# Patient Record
Sex: FEMALE | Race: WHITE
[De-identification: names, ages, dates, MRNs, and addresses within clinical notes are randomized per-mention and may not be internally consistent; named-entity substitution may affect disease eponyms.]

---

## 2017-01-03 ENCOUNTER — HOSPITAL ENCOUNTER (EMERGENCY)
Dept: HOSPITAL 21 - SED | Age: 42
Discharge: HOME | End: 2017-01-03
Payer: COMMERCIAL

## 2017-01-03 VITALS
DIASTOLIC BLOOD PRESSURE: 97 MMHG | OXYGEN SATURATION: 99 % | HEART RATE: 123 BPM | RESPIRATION RATE: 18 BRPM | SYSTOLIC BLOOD PRESSURE: 146 MMHG

## 2017-01-03 VITALS — WEIGHT: 180.38 LBS | HEIGHT: 66 IN | BODY MASS INDEX: 28.99 KG/M2

## 2017-01-03 DIAGNOSIS — K57.92: Primary | ICD-10-CM

## 2017-01-03 DIAGNOSIS — K21.9: ICD-10-CM

## 2017-01-03 DIAGNOSIS — Z88.0: ICD-10-CM

## 2017-01-03 DIAGNOSIS — Z88.8: ICD-10-CM

## 2017-01-03 DIAGNOSIS — G89.29: ICD-10-CM

## 2017-01-03 DIAGNOSIS — Z90.49: ICD-10-CM

## 2017-01-03 DIAGNOSIS — Z87.442: ICD-10-CM

## 2017-01-03 DIAGNOSIS — Z88.5: ICD-10-CM

## 2017-01-03 DIAGNOSIS — Z88.6: ICD-10-CM

## 2017-01-03 DIAGNOSIS — Z88.2: ICD-10-CM

## 2017-01-03 DIAGNOSIS — I10: ICD-10-CM

## 2017-01-03 DIAGNOSIS — J45.909: ICD-10-CM

## 2017-01-03 LAB
BASOPHILS % (AUTO): 0.3 % (ref 0–3)
EOSINOPHILS % (AUTO): 2 % (ref 0–5)
LIPASE SERPL-CCNC: 32 U/L (ref 13–60)
MAGNESIUM: 1.8 MG/DL (ref 1.6–2.6)
MCH RBC QN AUTO: 29.8 PG (ref 27–35)
MEAN CORPUSCULAR VOLUME: 89.6 FL (ref 81–100)
MONOCYTES % (AUTO): 5.3 % (ref 4–12)
NEUTROPHILS NFR BLD AUTO: 72.1 % (ref 40–74)
PLATELET COUNT: 316 BIL/L (ref 150–400)

## 2017-01-03 PROCEDURE — 96374 THER/PROPH/DIAG INJ IV PUSH: CPT

## 2017-01-03 PROCEDURE — 96375 TX/PRO/DX INJ NEW DRUG ADDON: CPT

## 2017-01-03 PROCEDURE — 83690 ASSAY OF LIPASE: CPT

## 2017-01-03 PROCEDURE — 36415 COLL VENOUS BLD VENIPUNCTURE: CPT

## 2017-01-03 PROCEDURE — 96376 TX/PRO/DX INJ SAME DRUG ADON: CPT

## 2017-01-03 PROCEDURE — 96361 HYDRATE IV INFUSION ADD-ON: CPT

## 2017-01-03 PROCEDURE — 83735 ASSAY OF MAGNESIUM: CPT

## 2017-01-03 PROCEDURE — 80053 COMPREHEN METABOLIC PANEL: CPT

## 2017-01-03 PROCEDURE — 85025 COMPLETE CBC W/AUTO DIFF WBC: CPT

## 2017-01-03 PROCEDURE — 99284 EMERGENCY DEPT VISIT MOD MDM: CPT

## 2017-01-03 NOTE — ED.REPORT
HPI-Abd Pain F 40 and Over


Date of Service


Reagan 3, 2017


 ED Provider: Dr. Toure


Pt is a 40 y/o female w/ a hx of diverticulitis, chronic pain, HTN, presenting 

to the ED due to lower abdominal pain onset 3 days ago. Pt was seen 3 days ago 

in the ED with the same symptoms and diagnosed with diverticulitis. It was 

recommended that she be admitted but she decided to go home. She was discharged 

on Levaquin, Flagyl, Percocet, and Zofran. Her symptoms have not improved. She 

denies fever, chills.


Nursing Notes


Stated Complaint:  NAUSEA/VOMITING/ABD PAIN


Chief Complaint:  Female Abdominal Pain


Nursing Notes Reviewed:  Yes


Allergies:  


Coded Allergies:  


     Sulfa (Sulfonamide Antibiotics) (Verified  Allergy, Severe, Anaphylaxis, 1/

3/17)


     meperidine (Verified  Allergy, Severe, ANAPHYLAXIS, 1/3/17)


     venom-honey bee (Verified  Allergy, Severe, Anaphylaxis, 1/3/17)


     Penicillins (Verified  Allergy, Unknown, 1/3/17)


     rofecoxib (Verified  Adverse Reaction, Severe, UPSET STOMACH, 7/17/16)


Scheduled


Fluoxetine (Prozac) 20 Mg Capsule 20 MG PO DAILY 


Levofloxacin (Levofloxacin) 750 Mg Tablet 750 MG PO DAILY 


Metronidazole (Metronidazole) 500 Mg Tablet 500 MG PO TID 


Ondansetron ODT (Ondansetron ODT) 8 Mg Tab.rapdis 8 MG PO QID 


Topiramate ER (Topiramate ER) 100 Mg Capsule 100 MG PO DAILY 





Scheduled PRN


Albuterol HFA (Proair HFA) 8.5 Gm Hfa.aer.ad 2 PUFFS INHALATION Q4H PRN PRN For 

Shortness of Breath 


Cyclobenzaprine (Cyclobenzaprine) 10 Mg Tablet 10 MG PO TID PRN PRN Spasm 


Epinephrine (Epipen 2-John) 0.3 Mg/0.3 Ml Auto.injct 0.3 MG IJ PRN PRN PRN For 

Anaphyllaxis 


Ibuprofen (Ibuprofen) 800 Mg Tablet 800 MG PO TID PRN PRN For Pain 


Metoclopramide (Reglan) 10 Mg Tablet 10 MG PO TID PRN PRN For Nausea 


Ondansetron ODT (Zofran ODT) 8 Mg Tablet 8 MG PO Q4H PRN PRN For Nausea 


Promethazine (Promethazine) 25 Mg Tablet 25 MG PO Q6H PRN PRN For Nausea 


Promethazine (Promethazine) 25 Mg Tablet 25 MG PO Q6H PRN PRN For Nausea/

Vomiting 


oxyCODONE-Acetaminophen 5-325 mg (oxyCODONE-Acetaminophen 5-325 mg) 1 Each 

Tablet 1-2 TAB PO Q6H PRN PRN For Pain 


oxyCODONE-Acetaminophen 5-325 mg (oxyCODONE-Acetaminophen 5-325 mg) 1 Each 

Tablet 1-2 TAB PO Q4H PRN PRN For Pain 





General


Time Seen by MD:  16:23





Chief Complaint


Abdominal pain


Hx Obtained From:  Patient


Arrived By:  Walk-in


Sudden in Onset?:  No


Onset Occurred:  3 days ago


Symptom Duration:  Since onset


Location:  : Abdomen lower


Quality:  Painful


Severity: Current:  Mild


Severity: Maximum:  Moderate


Recent Healthcare:  Recent doctor visit, Recent testing, Previous diagnosis, 

Prior workup


Similar Sx Previous:  Yes





Past Medical History


Past Medical History Notes:  


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


violaton per EMR 6/7/16


Patient has been seen in ED for Abd Pain multiple 


visits, no etiology determined





Surgeon: Lion Hathaway





Last abd CT--1/2016


Past Medical History





PAST MEDICAL HISTORY:


1.  Chronic neck and back pain secondary to degenerative disk disease.


2.  Chronic abdominal pain, with prior suggestion of possible sclerosing


    mesenteritis.


3.  Hypertension, not currently on medications.


4.  Asthma.


5.  Depression


6. Diverticulosis with hx of diverticulitis


7. Kidney stones


8. Gall bladder disease


9. GERD





Past Surgical History





PAST SURGICAL HISTORY:


1.  Bladder suspension.


2.  Right knee surgery.


3.  Right foot surgery.


4.  Cholecystectomy.


5.  Hysterectomy.


6.  Appendectomy.





Right Rotator Cuff Repair 8/6/2015-Dr. Hathaway


EGD and colonoscopy in May 2013 both negative


Bladder suspension


Right knee surgery


Fifth metatarsal surgery


Partial hysterectomy





Family History





Noncontributory





Smoking History


Never Smoker





Social History


Alcohol Use:  "Social"


Drug Use:  Denies drug use


Other Social History:  Local resident





Occupation





lives with partner of 17 years. On [place] [employer] [place]  10/22/2016





Ambulatory Status


Independent





Review of Systems


Constitutional:  Denies: Chills, Fever


GI:  Reports: Abdominal pain, Nausea, Vomiting, 


   Denies: Diarrhea


Complete sys rev & neg:  except as marked.





Physical Exam


Vital Signs





 Vital Signs (First)








  Date Time  Temp Pulse Resp B/P Pulse Ox O2 Delivery O2 Flow Rate FiO2


 


1/3/17 15:57 36.4 123 18 146/97 99 Room Air  








Initial VS:  Reviewed, Vital signs abnormal


    Head / Eyes:  Atraumatic, Normocephalic, PERRL


    ENT:  Mucous membranes moist, Conjunctiva normal, No scleral icterus


    Neck:  Supple, Full range of motion


    Extremities:  Vascular intact, Neuro intact, No swelling, No tenderness


    Skin:  Warm, Dry, No cyanosis


    Neurologic:  Alert, Oriented, Nonfocal


    Psychiatric:  Mood/affect normal, Behavior normal, Normal thought content


General/Constitutional:  Awake, Alert, No acute distress, Cooperative, Not 

toxic appearing


Respiratory / Chest:  Atraumatic, Breath sounds NL, Breath sounds = bilat, No 

respiratory distress, No rales, No rhonchi, No wheezing, No retractions, No 

stridor, No chest tenderness, No chest wall deformity, No crepitus


Cardiovascular:  Heart rate NL, Regular rhythm, Heart sounds NL, No gallop, No 

murmurs, No rubs, Cap refill not delayed, Peripheral circulation NL


Abdomen:  Atraumatic, Soft, No guarding, No rebound





Left sided abdominal tenderness


Back:  Full range of motion, Painless range of motion





Interpretation & Diagnostics


Lab Results Interpretation


Result Diagram:  


1/3/17 1611                                                                    

            1/3/17 1611














Test


  1/3/17


16:11


 


White Blood Count


  7.4th/mm3


(3.8-10.1)


 


Red Blood Count


  4.73mil/mm3


(3.90-5.20)


 


Hemoglobin


  14.1g/dL


(12.0-15.6)


 


Hematocrit


  42.4%


(35.0-46.0)


 


Mean Corpuscular Volume


  89.6fL


()


 


Mean Corpuscular Hemoglobin


  29.8pg


(27.0-35.0)


 


Mean Corpuscular Hemoglobin


Concent 33.3%


(32.0-37.0)


 


Red Cell Distribution Width


  12.3%


(12.3-15.4)


 


Platelet Count


  316bil/L


(150-400)


 


Neutrophils (%) (Auto) 72.1% (40-74) 


 


Lymphocytes (%) (Auto) 20.2% (14-46) 


 


Monocytes (%) (Auto) 5.3% (4-12) 


 


Eosinophils (%) (Auto) 2.0% (0-5) 


 


Basophils (%) (Auto) 0.3% (0-3) 


 


Sodium Level


  139mEq/L


(134-144)


 


Potassium Level


  3.8mEq/L


(3.5-5.2)


 


Chloride Level


  101mEq/L


()


 


Carbon Dioxide Level


  27mmol/L


(18-29)


 


Blood Urea Nitrogen 11mg/dL (6-24) 


 


Creatinine


  0.66mg/dL


(0.57-1.00)


 


Estimat Glomerular Filtration


Rate 141mL/min


(>59)


 


Glucose Level


  127mg/dL


(60-99)


 


Calcium Level


  9.6mg/dL


(8.5-10.1)


 


Magnesium Level


  1.8mg/dL


(1.6-2.6)


 


Total Bilirubin


  0.2mg/dL


(0.0-1.2)


 


Aspartate Amino Transf


(AST/SGOT) 20U/L (0-50) 


 


 


Alanine Aminotransferase


(ALT/SGPT) 20U/L (0-32) 


 


 


Alkaline Phosphatase 79U/L () 


 


Total Protein


  6.8g/dL


(6.4-8.4)


 


Albumin


  4.1g/dL


(3.4-5.0)


 


Lipase 32U/L (13-60) 


 


Hold Grey Top Tube


  Received


(Received)











Re-Eval/Medical Decision


Med Decision/Clinical Course





Reportedly symptoms are getting worse however the patient has a soft


abdomen and a benign abdominal exam and multiple evaluations, her vital


signs are normal, she is afebrile she has no leukocytosis, she has not


vomited while in the ER.  Her nausea and vomiting are under control and


she tolerated an oral trial while in the ER.  She is discharged with


promethazine and return precautions.


Re-Evaluation/Progress :  


   Time of Eval:  18:07


   Re-Evaluation/Progress Note:  


Pt rechecked. Informed pt of plan for treatment. Pt understands and agrees


with plan for treatment. F/U and RTER warnings given. All questions


addressed.


Counseled Regarding:  Diagnosis, Lab results, Need for follow-up, When/why to 

return to ED





Discharge & Departure





 Primary Impression:  


 Diverticulitis


 Diverticulitis site:  unspecified part of intestinal tract  Diverticulitis 

bleeding:  without bleeding  Diverticulitis complication:  without perforation 

or abscess  Qualified Code:  K57.92 - Diverticulitis of intestine, part 

unspecified, without perforation or abscess without bleeding


Disposition:  Home


Discharge Condition


All VS Reviewed:  Yes


Condition:  Stable





Additional Instructions:


Add promethazine as needed for nausea and vomiting.  Call your regular doctor 

in the morning for repeat evaluation.  Return to ER for persistent vomiting 

high fever or other concerns.


Referrals:  


OTHER,PHYSICIAN (PCP)


Scribe Attestation


Portions of this note were transcribed by Michael Lewis. I, Dr. Toure 

personally performed the history, physical exam and medical decision-making; I 

reviewed and confirmed the accuracy of the information in the transcribed note.





Signed by August Calderón, 1/3/17 - 1642








Leonardo Toure DO Reagan 3, 2017 16:23


MICHAEL LEWIS Reagan 3, 2017 16:36

## 2017-02-05 ENCOUNTER — HOSPITAL ENCOUNTER (EMERGENCY)
Dept: HOSPITAL 21 - SED | Age: 42
Discharge: HOME | End: 2017-02-05
Payer: COMMERCIAL

## 2017-02-05 VITALS
HEART RATE: 99 BPM | SYSTOLIC BLOOD PRESSURE: 176 MMHG | OXYGEN SATURATION: 100 % | DIASTOLIC BLOOD PRESSURE: 107 MMHG | RESPIRATION RATE: 16 BRPM

## 2017-02-05 VITALS — BODY MASS INDEX: 28.99 KG/M2 | HEIGHT: 66 IN | WEIGHT: 180.38 LBS

## 2017-02-05 VITALS
HEART RATE: 81 BPM | DIASTOLIC BLOOD PRESSURE: 101 MMHG | SYSTOLIC BLOOD PRESSURE: 142 MMHG | OXYGEN SATURATION: 99 % | RESPIRATION RATE: 20 BRPM

## 2017-02-05 DIAGNOSIS — Z88.0: ICD-10-CM

## 2017-02-05 DIAGNOSIS — Z90.49: ICD-10-CM

## 2017-02-05 DIAGNOSIS — K21.9: ICD-10-CM

## 2017-02-05 DIAGNOSIS — Z88.8: ICD-10-CM

## 2017-02-05 DIAGNOSIS — K57.92: Primary | ICD-10-CM

## 2017-02-05 DIAGNOSIS — Z88.2: ICD-10-CM

## 2017-02-05 DIAGNOSIS — I10: ICD-10-CM

## 2017-02-05 DIAGNOSIS — Z88.5: ICD-10-CM

## 2017-02-05 DIAGNOSIS — Z90.710: ICD-10-CM

## 2017-02-05 DIAGNOSIS — F43.0: ICD-10-CM

## 2017-02-05 DIAGNOSIS — J45.909: ICD-10-CM

## 2017-02-05 LAB
BASOPHILS % (AUTO): 0.2 % (ref 0–3)
EOSINOPHILS % (AUTO): 1.5 % (ref 0–5)
LIPASE SERPL-CCNC: 76 U/L (ref 13–60)
MAGNESIUM: 1.6 MG/DL (ref 1.6–2.6)
MCH RBC QN AUTO: 29.7 PG (ref 27–35)
MEAN CORPUSCULAR VOLUME: 89.2 FL (ref 81–100)
MONOCYTES % (AUTO): 7.1 % (ref 4–12)
NEUTROPHILS NFR BLD AUTO: 71.1 % (ref 40–74)
PLATELET COUNT: 243 BIL/L (ref 150–400)

## 2017-02-05 PROCEDURE — 96376 TX/PRO/DX INJ SAME DRUG ADON: CPT

## 2017-02-05 PROCEDURE — 99285 EMERGENCY DEPT VISIT HI MDM: CPT

## 2017-02-05 PROCEDURE — 83735 ASSAY OF MAGNESIUM: CPT

## 2017-02-05 PROCEDURE — 96375 TX/PRO/DX INJ NEW DRUG ADDON: CPT

## 2017-02-05 PROCEDURE — 83690 ASSAY OF LIPASE: CPT

## 2017-02-05 PROCEDURE — 85025 COMPLETE CBC W/AUTO DIFF WBC: CPT

## 2017-02-05 PROCEDURE — 96374 THER/PROPH/DIAG INJ IV PUSH: CPT

## 2017-02-05 PROCEDURE — 81025 URINE PREGNANCY TEST: CPT

## 2017-02-05 PROCEDURE — 80053 COMPREHEN METABOLIC PANEL: CPT

## 2017-02-05 RX ADMIN — HYDROMORPHONE HYDROCHLORIDE PRN MG: 1 INJECTION, SOLUTION INTRAMUSCULAR; INTRAVENOUS; SUBCUTANEOUS at 20:03

## 2017-02-05 RX ADMIN — HYDROMORPHONE HYDROCHLORIDE PRN MG: 1 INJECTION, SOLUTION INTRAMUSCULAR; INTRAVENOUS; SUBCUTANEOUS at 21:27

## 2017-02-05 RX ADMIN — HYDROMORPHONE HYDROCHLORIDE PRN MG: 1 INJECTION, SOLUTION INTRAMUSCULAR; INTRAVENOUS; SUBCUTANEOUS at 18:54

## 2017-02-05 NOTE — ED.REPORT
HPI-Abd Pain F 40 and Over


Date of Service


Feb 5, 2017


 ED Provider: Hawk Kearns MD


A 41 year old female with a history of diverticulitis, hypertension, chronic 

neck, back and abdominal pain presents to the ED complaining of abdominal pain 

that began 5 days ago. Patient describes the pain as "kidney pain" and rates 

her current pain as an 8/10. Associated symptoms include nausea, vomiting, 

shaking and frequent bowel movements. Nausea and vomiting began yesterday and 

she has had numerous episodes of vomiting. The pain is not exacerbated or 

relived by anything. She has been able to eat without difficulty. Patient was 

seen in the ED on 1/3 for diverticulitis and was discharged in good condition. 

She has had several visits to the ED for diverticulitis in the past year. 

Symptoms have become increasingly worse since onset. Patient has been taking 

promethazine for her nausea and ibuprofen with no relief. Patient reports that 

her son and sister recently passed away and she has been under more stress 

lately. She denies dysuria or fever.


Nursing Notes


Stated Complaint:  VOMITING/ABD PAIN


Chief Complaint:  Female Abdominal Pain


Nursing Notes Reviewed:  Yes ([employer], meds not reconciled)


Allergies:  


Coded Allergies:  


     Sulfa (Sulfonamide Antibiotics) (Verified  Allergy, Severe, Anaphylaxis, 1/

3/17)


     meperidine (Verified  Allergy, Severe, ANAPHYLAXIS, 1/3/17)


     venom-honey bee (Verified  Allergy, Severe, Anaphylaxis, 1/3/17)


     Penicillins (Verified  Allergy, Unknown, 1/3/17)


     rofecoxib (Verified  Adverse Reaction, Severe, UPSET STOMACH, 7/17/16)


Scheduled


Ciprofloxacin (Cipro) 500 Mg Tablet 500 MG PO BID 


Fluoxetine (Prozac) 20 Mg Capsule 20 MG PO DAILY 


Lactobacillus Acidophilus (Probiotic) 1 Each Capsule 1 EACH PO DAILY 


Levofloxacin (Levofloxacin) 750 Mg Tablet 750 MG PO DAILY 


Metronidazole (Metronidazole) 500 Mg Tablet 500 MG PO TID 


Metronidazole (Metronidazole) 500 Mg Tablet 500 MG PO TID 


Ondansetron ODT (Ondansetron ODT) 8 Mg Tab.rapdis 8 MG PO QID 


Topiramate ER (Topiramate ER) 100 Mg Capsule 100 MG PO DAILY 





Scheduled PRN


Albuterol HFA (Proair HFA) 8.5 Gm Hfa.aer.ad 2 PUFFS INHALATION Q4H PRN PRN For 

Shortness of Breath 


Cyclobenzaprine (Cyclobenzaprine) 10 Mg Tablet 10 MG PO TID PRN PRN Spasm 


Epinephrine (Epipen 2-John) 0.3 Mg/0.3 Ml Auto.injct 0.3 MG IJ PRN PRN PRN For 

Anaphyllaxis 


Ibuprofen (Ibuprofen) 800 Mg Tablet 800 MG PO TID PRN PRN For Pain 


Lorazepam (Lorazepam) 1 Mg Tablet 0.5-1 MG PO BID PRN PRN For Anxiety 


Metoclopramide (Reglan) 10 Mg Tablet 10 MG PO TID PRN PRN For Nausea 


Ondansetron ODT (Zofran ODT) 8 Mg Tablet 8 MG PO Q4H PRN PRN For Nausea 


Promethazine (Promethazine) 25 Mg Tablet 25 MG PO Q6H PRN PRN For Nausea 


Promethazine (Promethazine) 25 Mg Tablet 25 MG PO Q6H PRN PRN For Nausea/

Vomiting 


oxyCODONE-Acetaminophen 5-325 mg (oxyCODONE-Acetaminophen 5-325 mg) 1 Each 

Tablet 1-2 TAB PO Q6H PRN PRN For Pain 


oxyCODONE-Acetaminophen 5-325 mg (oxyCODONE-Acetaminophen 5-325 mg) 1 Each 

Tablet 1-2 TAB PO Q4H PRN PRN For Pain 





General


Time Seen by MD:  18:27





Chief Complaint


Abdominal pain


Hx Obtained From:  Patient


Arrived By:  Walk-in


Sudden in Onset?:  No


Onset Occurred:  5 days ago


Symptom Duration:  Since onset


Progression since Onset:  Unchanged


Location:  : Abdomen lower


Quality:  Painful


Radiation:  : Does not radiate


Severity: Current:  Mild


Severity: Maximum:  Moderate


Associated with:  Reports: Nausea, Vomiting, 


   Denies: Dysuria, Fever


Pertinent Negative:  Pt denies other symptoms


Recent Healthcare:  Recent doctor visit, Recent hospitalization





Risk Factors


)( AAA Risk Stratification


 Hypertension


Risk factors reviewed





Past Medical History


Past Medical History Notes:  


Patient seen in ED 12/31 for abd pain + diverticulitis on CT, also ED


visit diverticulitis


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


violaton per EMR 6/7/16








Surgeon: Lion Hathaway





Last abd CT--1/2016


Past Medical History





PAST MEDICAL HISTORY:


1.  Chronic neck and back pain secondary to degenerative disk disease.


2.  Chronic abdominal pain, with prior suggestion of possible sclerosing


    mesenteritis.


3.  Hypertension, not currently on medications.


4.  Asthma.


5.  Depression


6.  Diverticulosis with hx of diverticulitis (most recent 12/16, 1/17, and


here today 2/5/17 for sx)


7. Kidney stones


8. Gall bladder disease


9. GERD





Past Surgical History





PAST SURGICAL HISTORY:


1.  Bladder suspension.


2.  Right knee surgery.


3.  Right foot surgery.


4.  Cholecystectomy.


5.  Hysterectomy.


6.  Appendectomy.





Right Rotator Cuff Repair 8/6/2015-Dr. Hathaway


EGD and colonoscopy in May 2013 both negative


Bladder suspension


Right knee surgery


Fifth metatarsal surgery


Partial hysterectomy





Family History





Noncontributory





Smoking History


Never Smoker





Social History


Alcohol Use:  "Social"


Drug Use:  Denies drug use


Other Social History:  Local resident





Occupation





lives with partner of 17 years. On [place]  10/22/2016





Ambulatory Status


Independent





Review of Systems


Frequent bowel movements.


Constitutional:  Denies: Chills, Fever


Respiratory:  Denies: Shortness of breath


Cardiovascular:  Denies: Chest pain


GI:  Reports: Abdominal pain, Nausea, Vomiting


 Female:  Denies: Dysuria


Complete sys rev & neg:  except as marked.


Neurologic:  Denies: Change LOC


Psychiatric:  Reports: Stress





Physical Exam


Vital Signs





 Vital Signs (First)








  Date Time  Temp Pulse Resp B/P Pulse Ox O2 Delivery O2 Flow Rate FiO2


 


2/5/17 17:53 36.3 99 16 176/107 100 Room Air  








Initial VS:  Reviewed, Vital signs abnormal (HTN)


    Head / Eyes:  Atraumatic, Normocephalic, PERRL


    Extremities:  Vascular intact, Neuro intact, No swelling, No tenderness


    Skin:  Warm, Dry, No cyanosis


    Neurologic:  Alert, Oriented, Nonfocal


    Psychiatric:  Mood/affect normal, Behavior normal, Normal thought content


General/Constitutional:  Awake, Alert


Behavior:  Positive: Anxious


Respiratory / Chest:  Atraumatic, Breath sounds NL, Breath sounds = bilat


Cardiovascular:  Heart rate NL, Regular rhythm, Heart sounds NL


Abdomen:  Atraumatic, Soft, Non-tender


Back:  Atraumatic, Inspection NL





Interpretation & Diagnostics


Lab Results Interpretation


Result Diagram:  


2/5/17 1902                                                                    

            2/5/17 1902














Test


  2/5/17


19:02 2/5/17


19:48


 


White Blood Count


  9.6th/mm3


(3.8-10.1) 


 


 


Red Blood Count


  4.24mil/mm3


(3.90-5.20) 


 


 


Hemoglobin


  12.6g/dL


(12.0-15.6) 


 


 


Hematocrit


  37.8%


(35.0-46.0) 


 


 


Mean Corpuscular Volume


  89.2fL


() 


 


 


Mean Corpuscular Hemoglobin


  29.7pg


(27.0-35.0) 


 


 


Mean Corpuscular Hemoglobin


Concent 33.3%


(32.0-37.0) 


 


 


Red Cell Distribution Width


  12.6%


(12.3-15.4) 


 


 


Platelet Count


  243bil/L


(150-400) 


 


 


Neutrophils (%) (Auto) 71.1% (40-74)  


 


Lymphocytes (%) (Auto) 19.9% (14-46)  


 


Monocytes (%) (Auto) 7.1% (4-12)  


 


Eosinophils (%) (Auto) 1.5% (0-5)  


 


Basophils (%) (Auto) 0.2% (0-3)  


 


Sodium Level


  141mEq/L


(134-144) 


 


 


Potassium Level


  3.4mEq/L


(3.5-5.2) 


 


 


Chloride Level


  103mEq/L


() 


 


 


Carbon Dioxide Level


  24mmol/L


(18-29) 


 


 


Blood Urea Nitrogen 9mg/dL (6-24)  


 


Creatinine


  0.51mg/dL


(0.57-1.00) 


 


 


Estimat Glomerular Filtration


Rate 190mL/min


(>59) 


 


 


Glucose Level


  96mg/dL


(60-99) 


 


 


Calcium Level


  8.8mg/dL


(8.5-10.1) 


 


 


Magnesium Level


  1.6mg/dL


(1.6-2.6) 


 


 


Total Bilirubin


  0.2mg/dL


(0.0-1.2) 


 


 


Aspartate Amino Transf


(AST/SGOT) 20U/L (0-50) 


  


 


 


Alanine Aminotransferase


(ALT/SGPT) 27U/L (0-32) 


  


 


 


Alkaline Phosphatase 72U/L ()  


 


Total Protein


  7.0g/dL


(6.4-8.4) 


 


 


Albumin


  4.0g/dL


(3.4-5.0) 


 


 


Lipase 76U/L (13-60)  


 


Hold Grey Top Tube


  Received


(Received) 


 


 


Hold Urine


  


  Received


(Received)








Lab Results Interpretation:  


Urine Dip Negative





Pregnancy Negative





Re-Eval/Medical Decision


Med Decision/Clinical Course


This is a 41-year-old female presents with a chief complaint of abdominal pain 

nausea vomiting, along with a secondary complaint of acute grief and stressed 

as she is loss post first sign and her daughter did unanticipated deaths in 

recent weeks.


Patient is worried about the possibility of recurrent diverticulitis, having 

been diagnosed with diverticulitis by CT scan at the end of December and 

treated with antibiotics.  She also knowledge she has had intermittent 

abdominal pain of unclear etiology with negative workups previously.  A chronic 

narcotics, she had been given some diazepam several weeks ago by PCP, used only 

as prescribed, but is now out.  She feels extremely anxious as well-and she 

admits that she cannot tell if this is true diverticulitis, or a component of 

severe anxiety-or both.





On initial exam she is not febrile, but she is very anxious.  Her clinical 

abdominal exam is quite benign, she is not particular tender, has no guarding 

or rebound, and her exam improved over serial evaluations in the department.





She was seen by the MSW, and grief counseling resources were provided.





Symptoms were improved with a dose of lorazepam although she sought some 

abdominal pain and did receive some Dilaudid while workup was pursued-blood 

work was normal. (Her lipase was just out of the upper range of normal, but is 

nowhere near diagnostic, and her symptoms are not suggestive of pancreatitis, 

as she denies any EtOH use.)





The patient drink oral contrast anticipating the possibility of pursuing CT 

imaging, particularly if suggested by abnormal laboratory workup.





I reexamined the patient she is doing much better, she recently argues that she 

would much rather be treated empirically for the possibility of recurrent 

diverticulitis than undergo repeat CT imaging, she has had a number CT images 

and is worried about radiation exposure risk.  I do not think that course of 

action is unreasonable, but explained there is a slight increased risk in a 

patient he was just diagnosed recently with CT proven diverticulitis, as a 

recurrence may signal the development of an early abscess formation-ended if 

she chooses to forego imaging tonight, that it would be critical for her to 

return if there are any new or worsening symptoms, if she developed fever, if 

symptoms were not improving over the next 1-2 days - and that furthermore she 

needs to be seen and reevaluated this week by her primary care physician.  She 

expressed an understanding of these concerns, declined the CT in the department 

(which I think is reasonable given the caveats explained about) and requested 

empiric treatment.  Given her penicillin allergy ruled out the use of Augmentin

, the patient is being treated with ciprofloxacin, and a 14 day course given of 

her potential recurrent following a recent CT proven episode was prescribed.  

Additionally prescription for a probiotic was also provided.





Patient was provided a some total of 10 Percocet for pain control.  She 

promises to use only as instructed (there have been concerns about narcotic 

misuse in the past according to the EMR, and patient indicates that she did 

please see his medications previously)





The patient was also noted to be  hypertensive in the department, it improved 

with treatment-but she was advised to follow the PCP for recheck of blood 

pressure and further management.





Lastly I have also written for some when necessary lorazepam to use sparingly, 

given there is a severe component of anxiety, in the setting of an acute stress 

reaction following the death of her child and her sister.





Patient clinically appears well, is no distress, has a nontender abdomen at 

time of discharge.


Source of Hx:  Old records


Re-Evaluation/Progress :  


   Time of Eval:  20:54


   Patient Status:  Condition improved


   Re-Evaluation/Progress Note:  


Patient is rechecked. She is informed of her lab results and diagnosis.


Patient is given the option to have a CT due to her recent diagnosis of


diverticulitis. She does not want a CT scan at this time. All questions


are addressed. She understands and agrees with the treatment plan to


discharge with a follow up at [facility] counseling


Differential Diagnosis:  Positive: Acute abdominal pain, Diverticular disease, 


   Negative: Abdominal aortic aneurysm, Bowel obstruction, C. diff colitis, 

Diabetic ketoacidosis, Ectopic preg ruptured, Ectopic pregnancy, Esophageal 

rupture, Gun shot wound abdomen, Myocardial infarction, Pyelonephritis, Stab 

wound abdomen, Urinary retention, Urinary tract infection, Urolithiasis


Counseled Regarding:  Diagnosis, Lab results, Need for follow-up, When/why to 

return to ED





Discharge & Departure





 Primary Impression:  


 Acute stress reaction


 Additional Impressions:  


 Diverticulitis


 Diverticulitis site:  unspecified part of intestinal tract  Diverticulitis 

bleeding:  without bleeding  Diverticulitis complication:  without perforation 

or abscess  Qualified Code:  K57.92 - Diverticulitis of intestine, part 

unspecified, without perforation or abscess without bleeding


 HTN (hypertension)


 Hypertension type:  unspecified secondary hypertension  Hypertension goal:  

unspecified goal  Qualified Code:  I15.9 - Secondary hypertension, unspecified


 Nausea and vomiting


 Vomiting type:  unspecified  Vomiting Intractability:  unspecified  Qualified 

Code:  R11.2 - Nausea with vomiting, unspecified


 Abdominal pain


 Abdominal location:  generalized  Qualified Code:  R10.84 - Generalized 

abdominal pain


Disposition:  Home


Discharge Condition


All VS Reviewed:  Yes


Condition:  Stable


Patient Instructions:  Acute Nausea and Vomiting (ED), Diverticulitis (ED), 

Grief and Loss (ED)





Additional Instructions:


1.  Your blood tests today were normal.


2.  As discussed, it is reasonable to empirically treat with antibiotics for 

the possibility of diverticulitis-as discussed, given you have had a recent 

bout in recent weeks that was proven by CT scan, if you are not clearly 

improving over a few days, if he developed worsening symptoms, or if he develop 

a fever-you need to return to the emergency department for a reevaluation and 

probable CT scan.


3.  Otherwise, follow up with your primary care physician in a few days.


4.  I have provided 10 oxycodone/APAP to use for pain control if needed.  Note: 

This medication does contain a narcotic and causes drowsiness, no driving for 

at least 4-6 hours after taking


5.  Follow-up with the grief and stress resources as provided by the social 

worker.


6.  I have written a prescription for short course of lorazepam-you can take 1/2

-1 tab of 1 mg lorazepam up to twice a day.  Try to use only once a day if 

needed.  This medicine is an aide, but is not a good long-term medication-he is 

a controlled substance.  It to causes some drowsiness.


7.  Again, return if new or worsening symptoms.


8.  Your primary care doctor tomorrow to schedule an appointment this week.


9.  Take the antibiotics ciprofloxacin 500 mg twice a day for 14 days.


10.  Take the antibiotics metronidazole 500 mg 3 times a day for 14 days.  Do 

not drink any alcohol while you are taking this medicine.


11.  I recommend taking a probiotic for the next 20 days, to help replace the 

normal intestinal bacteria that are accidentally killed by the antibiotics.


Referrals:  


OTHER,PHYSICIAN (PCP)


Scribe Attestation


Portions of this note were transcribed by Nelsy Bryan. I, Dr. Kearns 

personally performed the history, physical exam and medical decision-making; I 

reviewed and confirmed the accuracy of the information in the transcribed note.


Signed by: August Carrasco, 02/05/17 2200.








Hawk Kearns MD Feb 5, 2017 18:42


NELSY BRYAN Feb 5, 2017 18:52

## 2017-02-08 ENCOUNTER — HOSPITAL ENCOUNTER (EMERGENCY)
Dept: HOSPITAL 21 - SED | Age: 42
Discharge: HOME | End: 2017-02-08
Payer: COMMERCIAL

## 2017-02-08 VITALS — BODY MASS INDEX: 28.99 KG/M2 | WEIGHT: 180.38 LBS | HEIGHT: 66 IN

## 2017-02-08 VITALS
RESPIRATION RATE: 15 BRPM | OXYGEN SATURATION: 98 % | HEART RATE: 96 BPM | SYSTOLIC BLOOD PRESSURE: 132 MMHG | DIASTOLIC BLOOD PRESSURE: 102 MMHG

## 2017-02-08 DIAGNOSIS — I10: ICD-10-CM

## 2017-02-08 DIAGNOSIS — Z88.1: ICD-10-CM

## 2017-02-08 DIAGNOSIS — K57.92: Primary | ICD-10-CM

## 2017-02-08 DIAGNOSIS — K21.9: ICD-10-CM

## 2017-02-08 DIAGNOSIS — Z88.2: ICD-10-CM

## 2017-02-08 DIAGNOSIS — Z91.030: ICD-10-CM

## 2017-02-08 DIAGNOSIS — J45.909: ICD-10-CM

## 2017-02-08 DIAGNOSIS — Z88.8: ICD-10-CM

## 2017-02-08 LAB
BASOPHILS % (AUTO): 0.2 % (ref 0–3)
EOSINOPHILS % (AUTO): 0.7 % (ref 0–5)
LIPASE SERPL-CCNC: 43 U/L (ref 13–60)
MAGNESIUM: 1.9 MG/DL (ref 1.6–2.6)
MCH RBC QN AUTO: 30.1 PG (ref 27–35)
MEAN CORPUSCULAR VOLUME: 90.7 FL (ref 81–100)
MONOCYTES % (AUTO): 4.8 % (ref 4–12)
NEUTROPHILS NFR BLD AUTO: 71.8 % (ref 40–74)
PLATELET COUNT: 224 BIL/L (ref 150–400)

## 2017-02-08 NOTE — ED.REPORT
HPI-Abd Pain F 40 and Over


Date of Service


2017


 ED Provider: Heladio Cabral MD


Pt is a 42 y/o female w/ a hx of chronic abdominal pain with opiate dependence, 

diverticulitis, HTN, presenting to the ED c/o diffuse abdominal pain onset 1 

week ago. The patient was seen here 3 days ago for similar symptoms at which 

time she declined a CT scan (because she has had so many in the past). Flagyl 

was prescribed during that visit. She returns today due to persistent abdominal 

pain and nausea (she ran out of Zofran). She c/o associated bright red rectal 

bleeding (which she attributes to hemorrhoids), nausea, vomiting for 5 days, 

diarrhea for 2 days, stress from recently losing her son. She denies dysuria, 

flank pain, hematuria, fever, chills, SOB, CP.


Nursing Notes


Stated Complaint:  ABDOMINAL PAIN


Chief Complaint:  Female Abdominal Pain


Nursing Notes Reviewed:  Yes


Allergies:  


Coded Allergies:  


     Sulfa (Sulfonamide Antibiotics) (Verified  Allergy, Severe, Anaphylaxis, 17)


     meperidine (Verified  Allergy, Severe, ANAPHYLAXIS, 17)


     venom-honey bee (Verified  Allergy, Severe, Anaphylaxis, 17)


     Penicillins (Verified  Allergy, Unknown, 17)


     rofecoxib (Verified  Adverse Reaction, Severe, UPSET STOMACH, 17)


Scheduled


Ciprofloxacin (Cipro) 500 Mg Tablet 500 MG PO BID 


Fluoxetine (Prozac) 20 Mg Capsule 20 MG PO DAILY 


Lactobacillus Acidophilus (Probiotic) 1 Each Capsule 1 EACH PO DAILY 


Levofloxacin (Levofloxacin) 750 Mg Tablet 750 MG PO DAILY 


Metronidazole (Metronidazole) 500 Mg Tablet 500 MG PO TID 


Metronidazole (Metronidazole) 500 Mg Tablet 500 MG PO TID 


Ondansetron ODT (Ondansetron ODT) 8 Mg Tab.rapdis 8 MG PO QID 


Topiramate ER (Topiramate ER) 100 Mg Capsule 100 MG PO DAILY 





Scheduled PRN


Albuterol HFA (Proair HFA) 8.5 Gm Hfa.aer.ad 2 PUFFS INHALATION Q4H PRN PRN For 

Shortness of Breath 


Cyclobenzaprine (Cyclobenzaprine) 10 Mg Tablet 10 MG PO TID PRN PRN Spasm 


Epinephrine (Epipen 2-John) 0.3 Mg/0.3 Ml Auto.injct 0.3 MG IJ PRN PRN PRN For 

Anaphyllaxis 


Hydrocodone-Acetaminophen 5-325 mg (Hydrocodone-Acetaminophen 5-325 mg) 1 Each 

Tablet 1 TABLET PO Q4H PRN PRN For Pain 


Ibuprofen (Ibuprofen) 800 Mg Tablet 800 MG PO TID PRN PRN For Pain 


Lorazepam (Lorazepam) 1 Mg Tablet 0.5-1 MG PO BID PRN PRN For Anxiety 


Metoclopramide (Reglan) 10 Mg Tablet 10 MG PO TID PRN PRN For Nausea 


Metoclopramide (Metoclopramide) 5 Mg Tablet 5 MG PO QID PRN PRN For Nausea 


Ondansetron ODT (Zofran ODT) 8 Mg Tablet 8 MG PO Q4H PRN PRN For Nausea 


Promethazine (Promethazine) 25 Mg Tablet 25 MG PO Q6H PRN PRN For Nausea 


Promethazine (Promethazine) 25 Mg Tablet 25 MG PO Q6H PRN PRN For Nausea/

Vomiting 


oxyCODONE-Acetaminophen 5-325 mg (oxyCODONE-Acetaminophen 5-325 mg) 1 Each 

Tablet 1-2 TAB PO Q6H PRN PRN For Pain 


oxyCODONE-Acetaminophen 5-325 mg (oxyCODONE-Acetaminophen 5-325 mg) 1 Each 

Tablet 1-2 TAB PO Q4H PRN PRN For Pain 





General


Time Seen by MD:  16:53





Chief Complaint


Abdominal pain


Hx Obtained From:  Patient


Arrived By:  Walk-in


Sudden in Onset?:  No


Onset Occurred:  1 week ago


Symptom Duration:  Since onset


Progression since Onset:  Unchanged


Location:  : Diffuse


Quality:  Painful


Severity: Current:  Mild


Severity: Maximum:  Moderate


Similar Sx Previous:  Yes





Past Medical History


Past Medical History Notes:  


Patient seen in ED  for abd pain + diverticulitis on CT, also ED


visit diverticulitis


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


violaton per EMR 16





Surgeon: Lion Hathaway


Last abd CT--2016


Past Medical History





PAST MEDICAL HISTORY:


1.  Chronic neck and back pain secondary to degenerative disk disease.


2.  Chronic abdominal pain, with prior suggestion of possible sclerosing


    mesenteritis.


3.  Hypertension


4.  Asthma.


5.  Depression


6.  Diverticulosis with hx of diverticulitis (most recent , , and


here today 17 for sx)


7. Kidney stones


8. Gall bladder disease


9. GERD





Past Surgical History





PAST SURGICAL HISTORY:


1.  Bladder suspension.


2.  Right knee surgery.


3.  Right foot surgery.


4.  Cholecystectomy.


5.  Hysterectomy.


6.  Appendectomy.





Right Rotator Cuff Repair 2015-Dr. Hathaway


EGD and colonoscopy in May 2013 both negative


Bladder suspension


Right knee surgery


Fifth metatarsal surgery


Partial hysterectomy





Family History





Noncontributory





Smoking History


Never Smoker





Social History


Alcohol Use:  "Social"


Drug Use:  Denies drug use


Other Social History:  Local resident





Occupation





lives with partner of 17 years. On L and I  10/22/2016





Ambulatory Status


Independent





Review of Systems


Constitutional:  Denies: Chills, Fever


Respiratory:  Denies: Shortness of breath


Cardiovascular:  Denies: Chest pain


GI:  Reports: Abdominal pain, Bloody/tarry stool, Diarrhea, Nausea, Vomiting, 


   Denies: Hematemesis, Melena


 Female:  Denies: Dysuria, Flank pain, Hematuria


Complete sys rev & neg:  except as marked.


Psychiatric:  Reports: Stress





Physical Exam


Vital Signs





 Vital Signs (First)








  Date Time  Temp Pulse Resp B/P Pulse Ox O2 Delivery O2 Flow Rate FiO2


 


17 15:09 36.4 96 15 132/102 98   








Initial VS:  Reviewed, Vital signs normal


    Head / Eyes:  Atraumatic, Normocephalic, PERRL


    ENT:  Mucous membranes moist, Conjunctiva normal, No scleral icterus


    Neck:  Supple, Full range of motion


    Extremities:  Vascular intact, Neuro intact, No swelling, No tenderness


    Skin:  Warm, Dry, No cyanosis


    Neurologic:  Alert, Oriented, Nonfocal


    Psychiatric:  Mood/affect normal, Behavior normal, Normal thought content


General/Constitutional:  Awake, Alert, No acute distress, Cooperative, Not 

toxic appearing


Respiratory / Chest:  Atraumatic, Breath sounds NL, Breath sounds = bilat, No 

respiratory distress, No rales, No rhonchi, No wheezing, No retractions, No 

stridor, No chest tenderness, No chest wall deformity, No crepitus


Cardiovascular:  Heart rate NL, Regular rhythm, Heart sounds NL, No gallop, No 

murmurs, No rubs, Cap refill not delayed, Peripheral circulation NL


Abdomen:  Atraumatic, Soft, No guarding, No rebound, No palpable mass


Tenderness/Guarding/Rebound:  Positive: Tender LLQ... (Mild), Tender epigastric 

(mild), 


   Negative: Tender RLQ...


Back:  Full range of motion, Painless range of motion





Interpretation & Diagnostics


Lab Results Interpretation


Result Diagram:  


17 1621                                                                    

            17 1621














Test


  17


16:21 17


17:43


 


White Blood Count


  8.3th/mm3


(3.8-10.1) 


 


 


Red Blood Count


  4.29mil/mm3


(3.90-5.20) 


 


 


Hemoglobin


  12.9g/dL


(12.0-15.6) 


 


 


Hematocrit


  38.9%


(35.0-46.0) 


 


 


Mean Corpuscular Volume


  90.7fL


() 


 


 


Mean Corpuscular Hemoglobin


  30.1pg


(27.0-35.0) 


 


 


Mean Corpuscular Hemoglobin


Concent 33.2%


(32.0-37.0) 


 


 


Red Cell Distribution Width


  12.7%


(12.3-15.4) 


 


 


Platelet Count


  224bil/L


(150-400) 


 


 


Neutrophils (%) (Auto) 71.8% (40-74)  


 


Lymphocytes (%) (Auto) 22.3% (14-46)  


 


Monocytes (%) (Auto) 4.8% (4-12)  


 


Eosinophils (%) (Auto) 0.7% (0-5)  


 


Basophils (%) (Auto) 0.2% (0-3)  


 


Sodium Level


  136mEq/L


(134-144) 


 


 


Potassium Level


  4.1mEq/L


(3.5-5.2) 


 


 


Chloride Level


  101mEq/L


() 


 


 


Carbon Dioxide Level


  22mmol/L


(18-29) 


 


 


Blood Urea Nitrogen 11mg/dL (6-24)  


 


Creatinine


  0.61mg/dL


(0.57-1.00) 


 


 


Estimat Glomerular Filtration


Rate 155mL/min


(>59) 


 


 


Glucose Level


  106mg/dL


(60-99) 


 


 


Calcium Level


  8.4mg/dL


(8.5-10.1) 


 


 


Magnesium Level


  1.9mg/dL


(1.6-2.6) 


 


 


Total Bilirubin


  0.2mg/dL


(0.0-1.2) 


 


 


Aspartate Amino Transf


(AST/SGOT) 25U/L (0-50) 


  


 


 


Alanine Aminotransferase


(ALT/SGPT) 28U/L (0-32) 


  


 


 


Alkaline Phosphatase 65U/L ()  


 


Total Protein


  6.5g/dL


(6.4-8.4) 


 


 


Albumin


  4.0g/dL


(3.4-5.0) 


 


 


Lipase 43U/L (13-60)  


 


Hold Grey Top Tube


  Received


(Received) 


 


 


Hold Urine


  


  Received


(Received)








Lab Results Interpretation:  


UA negative





Re-Eval/Medical Decision


Med Decision/Clinical Course





41-year-old female history of chronic abdominal pain and diverticulitis


presenting with left lower quadrant pain.  Patient was diagnosed with


diverticulitis several days ago and started ciprofloxacin and Flagyl.  She


declined CT scan at that time.  She reports persistent pain today.  She


declines a CT scan again.  Her labs show normal white blood cell count.


She is afebrile here.  Urine negative nitrites leukocytes blood.  Abdomen


with mild left lower quadrant tenderness no rebound or guarding no


peritoneal signs.  Patient's son recently  one month ago due to asthma


exacerbation at the age of 23.  She is very distraught about this and


requested to leave abruptly to go comfort her daughter who lives in


[place].  She declined imaging as above.  She will continue Cipro


Flagyl.  She will return if her symptoms worsen or persist.  Given a


prescription for Zofran and 10 Norco.  Return precautions given.


Source of Hx:  Old records


Re-Evaluation/Progress :  


   Time of Eval:  18:07


   Re-Evaluation/Progress Note:  


Pt rechecked. She is declining CT at this time and I believe this is


appropriate considering no elevated WBC or fever. Informed pt of plan for


treatment. Pt understands and agrees with plan for treatment. F/U


instructions and RTER warnings given.  All questions addressed.


Counseled Regarding:  Diagnosis, Lab results, Need for follow-up, When/why to 

return to ED





Discharge & Departure





 Primary Impression:  


 Abdominal pain


 Abdominal location:  left lower quadrant  Qualified Code:  R10.32 - Left lower 

quadrant pain


 Additional Impression:  


 Diverticulitis


 Diverticulitis site:  unspecified part of intestinal tract  Diverticulitis 

bleeding:  without bleeding  Diverticulitis complication:  without perforation 

or abscess  Qualified Code:  K57.92 - Diverticulitis of intestine, part 

unspecified, without perforation or abscess without bleeding


Disposition:  Home


Discharge Condition


All VS Reviewed:  Yes


Condition:  Stable


Patient Instructions:  Acute Abdominal Pain (ED), Diverticulitis (ED)





Additional Instructions:


Continue the full course of your antibiotics that were previously prescribed 

for probable diverticulitis.





Your white blood cell count was normal today which does not indicate an 

infection although with your history diverticulitis is possible.





Take Zofran every 4 hours as needed for nausea and vomiting.





Take the narcotic pain medication as needed for severe breakthrough pain. I 

recommend Ibuprofen or Tylenol for aching pain.





Return to the emergency department for increasing abdominal pain, persistent 

vomiting, high fever, profound weakness, or other concerning symptoms.





Follow-up with your doctor later this week or early next week.


Referrals:  


[facility] (PCP)


August Attestation


Portions of this note were transcribed by Michael Lewis. I, Dr. Cabral 

personally performed the history, physical exam and medical decision-making; I 

reviewed and confirmed the accuracy of the information in the transcribed note.





Signed by August Calderón, 17 - [id number]


copies to:   [facility]








Heladio Cabral MD 2017 17:13


MICHAEL LEWIS 2017 17:42

## 2017-03-13 ENCOUNTER — HOSPITAL ENCOUNTER (EMERGENCY)
Dept: HOSPITAL 21 - SED | Age: 42
Discharge: HOME | End: 2017-03-13
Payer: COMMERCIAL

## 2017-03-13 VITALS
RESPIRATION RATE: 20 BRPM | OXYGEN SATURATION: 96 % | DIASTOLIC BLOOD PRESSURE: 79 MMHG | SYSTOLIC BLOOD PRESSURE: 122 MMHG | HEART RATE: 92 BPM

## 2017-03-13 VITALS
DIASTOLIC BLOOD PRESSURE: 88 MMHG | SYSTOLIC BLOOD PRESSURE: 115 MMHG | OXYGEN SATURATION: 100 % | RESPIRATION RATE: 15 BRPM | HEART RATE: 79 BPM

## 2017-03-13 VITALS — HEIGHT: 66 IN | BODY MASS INDEX: 28.99 KG/M2 | WEIGHT: 180.38 LBS

## 2017-03-13 DIAGNOSIS — K52.9: Primary | ICD-10-CM

## 2017-03-13 DIAGNOSIS — J45.909: ICD-10-CM

## 2017-03-13 DIAGNOSIS — Z88.2: ICD-10-CM

## 2017-03-13 DIAGNOSIS — Z88.8: ICD-10-CM

## 2017-03-13 DIAGNOSIS — R09.81: ICD-10-CM

## 2017-03-13 DIAGNOSIS — Z91.030: ICD-10-CM

## 2017-03-13 DIAGNOSIS — Z88.0: ICD-10-CM

## 2017-03-13 DIAGNOSIS — K21.9: ICD-10-CM

## 2017-03-13 DIAGNOSIS — R05: ICD-10-CM

## 2017-03-13 DIAGNOSIS — Z98.890: ICD-10-CM

## 2017-03-13 DIAGNOSIS — Z88.5: ICD-10-CM

## 2017-03-13 DIAGNOSIS — Z90.49: ICD-10-CM

## 2017-03-13 DIAGNOSIS — R50.9: ICD-10-CM

## 2017-03-13 DIAGNOSIS — I10: ICD-10-CM

## 2017-03-13 LAB
BASOPHILS % (AUTO): 0.4 % (ref 0–3)
EOSINOPHILS % (AUTO): 2.8 % (ref 0–5)
LIPASE SERPL-CCNC: 32 U/L (ref 13–60)
MAGNESIUM: 2 MG/DL (ref 1.6–2.6)
MCH RBC QN AUTO: 29.8 PG (ref 27–35)
MEAN CORPUSCULAR VOLUME: 90.7 FL (ref 81–100)
MONOCYTES % (AUTO): 15.5 % (ref 4–12)
NEUTROPHILS NFR BLD AUTO: 55.5 % (ref 40–74)
PLATELET COUNT: 178 BIL/L (ref 150–400)

## 2017-03-13 PROCEDURE — 96376 TX/PRO/DX INJ SAME DRUG ADON: CPT

## 2017-03-13 PROCEDURE — 36415 COLL VENOUS BLD VENIPUNCTURE: CPT

## 2017-03-13 PROCEDURE — 81025 URINE PREGNANCY TEST: CPT

## 2017-03-13 PROCEDURE — 96361 HYDRATE IV INFUSION ADD-ON: CPT

## 2017-03-13 PROCEDURE — 83735 ASSAY OF MAGNESIUM: CPT

## 2017-03-13 PROCEDURE — 96374 THER/PROPH/DIAG INJ IV PUSH: CPT

## 2017-03-13 PROCEDURE — 80053 COMPREHEN METABOLIC PANEL: CPT

## 2017-03-13 PROCEDURE — 99285 EMERGENCY DEPT VISIT HI MDM: CPT

## 2017-03-13 PROCEDURE — 85025 COMPLETE CBC W/AUTO DIFF WBC: CPT

## 2017-03-13 PROCEDURE — 83690 ASSAY OF LIPASE: CPT

## 2017-03-13 PROCEDURE — 96375 TX/PRO/DX INJ NEW DRUG ADDON: CPT

## 2017-03-13 RX ADMIN — DEXTROSE MONOHYDRATE PRN MG: 50 INJECTION, SOLUTION INTRAVENOUS at 19:40

## 2017-03-13 RX ADMIN — DEXTROSE MONOHYDRATE PRN MG: 50 INJECTION, SOLUTION INTRAVENOUS at 17:19

## 2017-03-13 NOTE — ED.REPORT
HPI-Abd Pain F 40 and Over


Date of Service


Mar 13, 2017


 ED Provider: Carol Norris MD


A 41 year old female with a medical history including hypertension, asthma, 

diverticulitis, kidney stones, GERD, and chronic abdominal pain presents to the 

ED with worsening nausea and vomiting onset three days ago. Associated symptoms 

include left-sided abdominal pain, diarrhea, cough, nasal congestion, and 

subjective fever. The abdominal pain is described as "stabbing" in nature. The 

patient denies melena, hematochezia, hematemesis, or other symptoms.


Nursing Notes


Stated Complaint:  ABDOMINAL PAIN, NAUSEA


Chief Complaint:  Female Abdominal Pain


Nursing Notes Reviewed:  Yes


Allergies:  


Coded Allergies:  


     Sulfa (Sulfonamide Antibiotics) (Verified  Allergy, Severe, Anaphylaxis, 2/ 8/17)


     meperidine (Verified  Allergy, Severe, ANAPHYLAXIS, 2/8/17)


     venom-honey bee (Verified  Allergy, Severe, Anaphylaxis, 2/8/17)


     Penicillins (Verified  Allergy, Unknown, 2/8/17)


     rofecoxib (Verified  Adverse Reaction, Severe, UPSET STOMACH, 2/8/17)


Scheduled


Ciprofloxacin (Cipro) 500 Mg Tablet 500 MG PO BID 


Fluoxetine (Prozac) 20 Mg Capsule 20 MG PO DAILY 


Lactobacillus Acidophilus (Probiotic) 1 Each Capsule 1 EACH PO DAILY 


Levofloxacin (Levofloxacin) 750 Mg Tablet 750 MG PO DAILY 


Metronidazole (Metronidazole) 500 Mg Tablet 500 MG PO TID 


Metronidazole (Metronidazole) 500 Mg Tablet 500 MG PO TID 


Ondansetron ODT (Ondansetron ODT) 8 Mg Tab.rapdis 8 MG PO QID 


Ondansetron ODT (Ondansetron ODT) 8 Mg Tab.rapdis 8 MG PO Q6H 


Topiramate ER (Topiramate ER) 100 Mg Capsule 100 MG PO DAILY 





Scheduled PRN


Albuterol HFA (Proair HFA) 8.5 Gm Hfa.aer.ad 2 PUFFS INHALATION Q4H PRN PRN For 

Shortness of Breath 


Cyclobenzaprine (Cyclobenzaprine) 10 Mg Tablet 10 MG PO TID PRN PRN Spasm 


Epinephrine (Epipen 2-John) 0.3 Mg/0.3 Ml Auto.injct 0.3 MG IJ PRN PRN PRN For 

Anaphyllaxis 


Hydrocodone-Acetaminophen 5-325 mg (Hydrocodone-Acetaminophen 5-325 mg) 1 Each 

Tablet 1 TABLET PO Q4H PRN PRN For Pain 


Ibuprofen (Ibuprofen) 800 Mg Tablet 800 MG PO TID PRN PRN For Pain 


Lorazepam (Lorazepam) 1 Mg Tablet 0.5-1 MG PO BID PRN PRN For Anxiety 


Metoclopramide (Reglan) 10 Mg Tablet 10 MG PO TID PRN PRN For Nausea 


Metoclopramide (Metoclopramide) 5 Mg Tablet 5 MG PO QID PRN PRN For Nausea 


Ondansetron ODT (Zofran ODT) 8 Mg Tablet 8 MG PO Q4H PRN PRN For Nausea 


Promethazine (Promethazine) 25 Mg Tablet 25 MG PO Q6H PRN PRN For Nausea 


Promethazine (Promethazine) 25 Mg Tablet 25 MG PO Q6H PRN PRN For Nausea/

Vomiting 


oxyCODONE-Acetaminophen 5-325 mg (oxyCODONE-Acetaminophen 5-325 mg) 1 Each 

Tablet 1-2 TAB PO Q6H PRN PRN For Pain 


oxyCODONE-Acetaminophen 5-325 mg (oxyCODONE-Acetaminophen 5-325 mg) 1 Each 

Tablet 1-2 TAB PO Q4H PRN PRN For Pain 





General


Time Seen by MD:  17:02





Chief Complaint


Nausea, Vomiting moderate


Hx Obtained From:  Patient


Arrived By:  Walk-in


Sudden in Onset?:  Yes


Onset Occurred:  3 days ago


Symptom Duration:  Since onset


Progression since Onset:  Gradually worsening


Location:  : LLQ: LUQ


Quality:  Painful, Stabbing


Severity: Current:  Moderate


Severity: Maximum:  Moderate


Associated with:  Reports: Diarrhea, Fever, 


   Denies: Hematemesis, Hematochezia


Pertinent Negative:  Relieved by nothing





Context


Related History:  Reports: Abdominal surgery, Diverticulosis, GERD


Recent Healthcare:  No recent doctor visit


Similar Sx Previous:  Yes





Past Medical History


Past Medical History Notes:  


Patient seen in ED 12/31 for abd pain + diverticulitis on CT, also ED


visit diverticulitis


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


violaton per EMR 6/7/16





Surgeon: Lion Hathaway


Last abd CT--1/2016


Past Medical History





PAST MEDICAL HISTORY:


1.  Chronic neck and back pain secondary to degenerative disk disease.


2.  Chronic abdominal pain, with prior suggestion of possible sclerosing


    mesenteritis.


3.  Hypertension


4.  Asthma.


5.  Depression


6.  Diverticulosis with hx of diverticulitis (most recent 12/16, 1/17, and


here today 2/5/17 for sx)


7. Kidney stones


8. Gall bladder disease


9. GERD





Past Surgical History





PAST SURGICAL HISTORY:


1.  Bladder suspension.


2.  Right knee surgery.


3.  Right foot surgery.


4.  Cholecystectomy.


5.  Hysterectomy.


6.  Appendectomy.





Right Rotator Cuff Repair 8/6/2015-Dr. Hathaway


EGD and colonoscopy in May 2013 both negative


Bladder suspension


Right knee surgery


Fifth metatarsal surgery


Partial hysterectomy





Family History





Noncontributory





Smoking History


Never Smoker





Social History


Alcohol Use:  "Social"


Drug Use:  Denies drug use


Other Social History:  Local resident





Occupation





lives with partner of 17 years. On L and I  10/22/2016





Ambulatory Status


Independent





Review of Systems


Constitutional:  Reports: Fever (Subjective)


Respiratory:  Reports: Non-productive cough, 


   Denies: Shortness of breath


GI:  Reports: Abdominal pain (Left-sided), Diarrhea, Nausea, Vomiting, 


   Denies: Hematemesis, Hematochezia, Melena


Complete sys rev & neg:  except as marked.


Ears / Nose / Throat:  Reports: Nasal congestion





Physical Exam


Vital Signs





 Vital Signs (First)








  Date Time  Temp Pulse Resp B/P Pulse Ox O2 Delivery O2 Flow Rate FiO2


 


3/13/17 15:00 36.3 92 20 122/79 96 Room Air  








Initial VS:  Reviewed, Vital signs normal


    Head / Eyes:  Atraumatic, Normocephalic


    Neck:  Supple, Full range of motion


    Skin:  Warm, Dry, No cyanosis


    Neurologic:  Alert, Oriented, Nonfocal


    Psychiatric:  Mood/affect normal, Behavior normal, Normal thought content


General/Constitutional:  Awake, Alert


Respiratory / Chest:  Breath sounds NL, Breath sounds = bilat, No respiratory 

distress


Cardiovascular:  Heart rate NL, Regular rhythm, Heart sounds NL, No murmurs, No 

rubs


Abdomen:  Soft, No guarding, No rebound


Tenderness/Guarding/Rebound:  Positive: Tender diffuse


ENT:  Airway patent


Mouth:  Positive: Mucous membranes dry





Interpretation & Diagnostics


URINE PREGNANCY: Negative





URINE DIPSTICK:


1.025 sp gravity


+30 Protein


Normal Glucose


++Moderate Ketones


1mg/dl Urobilinogen


Otherwise Negative


Lab Results Interpretation


Result Diagram:  


3/13/17 1600                                                                   

             3/13/17 1600














Test


  3/13/17


16:00 3/13/17


17:32


 


White Blood Count


  2.8th/mm3


(3.8-10.1) 


 


 


Red Blood Count


  4.53mil/mm3


(3.90-5.20) 


 


 


Hemoglobin


  13.5g/dL


(12.0-15.6) 


 


 


Hematocrit


  41.1%


(35.0-46.0) 


 


 


Mean Corpuscular Volume


  90.7fL


() 


 


 


Mean Corpuscular Hemoglobin


  29.8pg


(27.0-35.0) 


 


 


Mean Corpuscular Hemoglobin


Concent 32.8%


(32.0-37.0) 


 


 


Red Cell Distribution Width


  13.6%


(12.3-15.4) 


 


 


Platelet Count


  178bil/L


(150-400) 


 


 


Neutrophils (%) (Auto) 55.5% (40-74)  


 


Lymphocytes (%) (Auto) 25.8% (14-46)  


 


Monocytes (%) (Auto) 15.5% (4-12)  


 


Eosinophils (%) (Auto) 2.8% (0-5)  


 


Basophils (%) (Auto) 0.4% (0-3)  


 


Sodium Level


  138mEq/L


(134-144) 


 


 


Potassium Level


  4.2mEq/L


(3.5-5.2) 


 


 


Chloride Level


  103mEq/L


() 


 


 


Carbon Dioxide Level


  22mmol/L


(18-29) 


 


 


Blood Urea Nitrogen 9mg/dL (6-24)  


 


Creatinine


  0.80mg/dL


(0.57-1.00) 


 


 


Estimat Glomerular Filtration


Rate 113mL/min


(>59) 


 


 


Glucose Level


  89mg/dL


(60-99) 


 


 


Calcium Level


  8.7mg/dL


(8.5-10.1) 


 


 


Magnesium Level


  2.0mg/dL


(1.6-2.6) 


 


 


Total Bilirubin


  0.5mg/dL


(0.0-1.2) 


 


 


Aspartate Amino Transf


(AST/SGOT) 32U/L (0-50) 


  


 


 


Alanine Aminotransferase


(ALT/SGPT) 43U/L (0-32) 


  


 


 


Alkaline Phosphatase


  140U/L


() 


 


 


Total Protein


  7.1g/dL


(6.4-8.4) 


 


 


Albumin


  4.0g/dL


(3.4-5.0) 


 


 


Lipase 32U/L (13-60)  


 


Hold Grey Top Tube


  Received


(Received) 


 


 


Hold Urine


  


  Received


(Received)











Re-Eval/Medical Decision


Med Decision/Clinical Course


The patient has generalized pain however she has a benign abdominal exam.  

Differential diagnoses considered were gastroenteritis, bowel ischemia, 

irritable bowel syndrome, and diverticulitis.


Source of Hx:  Old records


Re-Evaluation/Progress :  


   Time of Eval:  19:30


   Patient Status:  Condition improved


   Re-Evaluation/Progress Note:  


The patient has not vomited or experienced diarrhea again in the ED.


Discussed with patient lab results, diagnosis, and plan for discharge.


Follow-up and return to the ER instructions given. Patient agrees with


plan for care and all questions were addressed.


Counseled Regarding:  Diagnosis, Lab results, Need for follow-up, When/why to 

return to ED





Discharge & Departure





 Primary Impression:  


 Gastroenteritis


Disposition:  Home


Discharge Condition


All VS Reviewed:  Yes


Condition:  Improved


Patient Instructions:  Gastroenteritis (ED)





Additional Instructions:


Thank you for entrusting us with your care.





Your labs today were reassuring.





Drink plenty of fluids to remain hydrated. Eat a bland, soft diet for at least 

the next 24 hours. Advance as tolerated.


Avoid dairy for at least a week.





Please take Ondansetron as prescribed.





Call your primary care provider tomorrow for a follow-up appointment.





Return to the ER with any new or worsening symptoms.


Referrals:  


[facility] (PCP)


August Attestation


Portions of this note were transcribed by Carly Frazier. I, Dr. Norris, 

personally performed the history, physical exam, and medical decision-making; I 

reviewed and confirmed the accuracy of the information in the transcribed note.





Signed by: August Martinez, 3/13/2017, 20:15


copies to:   [facility]








Carol Norris MD Mar 13, 2017 17:03


CARLY FRAZIER Mar 13, 2017 17:35

## 2017-03-14 ENCOUNTER — HOSPITAL ENCOUNTER (EMERGENCY)
Dept: HOSPITAL 21 - SED | Age: 42
Discharge: LEFT BEFORE DISCHARGE | End: 2017-03-14
Payer: COMMERCIAL

## 2017-03-14 VITALS
SYSTOLIC BLOOD PRESSURE: 127 MMHG | OXYGEN SATURATION: 97 % | RESPIRATION RATE: 18 BRPM | DIASTOLIC BLOOD PRESSURE: 89 MMHG | HEART RATE: 87 BPM

## 2017-03-14 VITALS — WEIGHT: 180.38 LBS | BODY MASS INDEX: 28.99 KG/M2 | HEIGHT: 66 IN

## 2017-03-14 DIAGNOSIS — R10.84: Primary | ICD-10-CM

## 2017-03-14 DIAGNOSIS — K52.9: ICD-10-CM

## 2017-03-14 DIAGNOSIS — Z90.49: ICD-10-CM

## 2017-03-14 DIAGNOSIS — Z88.2: ICD-10-CM

## 2017-03-14 DIAGNOSIS — I10: ICD-10-CM

## 2017-03-14 DIAGNOSIS — J45.909: ICD-10-CM

## 2017-03-14 DIAGNOSIS — K21.9: ICD-10-CM

## 2017-03-14 DIAGNOSIS — Z88.0: ICD-10-CM

## 2017-03-14 DIAGNOSIS — Z91.030: ICD-10-CM

## 2017-03-14 DIAGNOSIS — Z88.8: ICD-10-CM

## 2017-03-14 LAB
BASOPHILS % (AUTO): 2.6 % (ref 0–3)
EOSINOPHILS % (AUTO): 5.1 % (ref 0–5)
LIPASE SERPL-CCNC: 35 U/L (ref 13–60)
MAGNESIUM: 1.9 MG/DL (ref 1.6–2.6)
MCH RBC QN AUTO: 30 PG (ref 27–35)
MEAN CORPUSCULAR VOLUME: 91.6 FL (ref 81–100)
MONOCYTES % (AUTO): 12.1 % (ref 4–12)
NEUTROPHILS NFR BLD AUTO: 48.3 % (ref 40–74)
PLATELET COUNT: 186 BIL/L (ref 150–400)

## 2017-03-14 NOTE — ED.REPORT
HPI-Abd Pain F 40 and Over


Date of Service


Mar 14, 2017


 ED Provider: Carol Norris MD


Patient is a 41 year old female with chronic abdominal pain secondary to 

diverticulitis who presents to the ED after being seen in the department 

yesterday complaining of vomiting. Associated symptoms include nausea, 

abdominal pain (L sided), subjective fevers, cough, and diarrhea (x4). She 

denies chills, hematemesis, hematochezia, or any other symptoms. 


She is not able to get into her PCP until Monday.


Nursing Notes


Stated Complaint:  ABDOMINAL PAIN, NAUSEA


Chief Complaint:  Female Abdominal Pain


Nursing Notes Reviewed:  Yes


Allergies:  


Coded Allergies:  


     Sulfa (Sulfonamide Antibiotics) (Verified  Allergy, Severe, Anaphylaxis, 3/

14/17)


     meperidine (Verified  Allergy, Severe, ANAPHYLAXIS, 3/14/17)


     venom-honey bee (Verified  Allergy, Severe, Anaphylaxis, 3/14/17)


     Penicillins (Verified  Allergy, Unknown, 3/14/17)


     rofecoxib (Verified  Adverse Reaction, Severe, UPSET STOMACH, 3/14/17)


Scheduled PRN


Albuterol HFA (Proair HFA) 8.5 Gm Hfa.aer.ad 2 PUFFS INHALATION Q4H PRN PRN For 

Shortness of Breath 


Lorazepam (Lorazepam) 1 Mg Tablet 0.5-1 MG PO BID PRN PRN For Anxiety 


Naproxen Sodium (Aleve) 220 Mg Capsule 2 TAB PO BID PRN PRN For Pain 


Ondansetron ODT (Zofran ODT) 8 Mg Tablet 8 MG PO Q4H PRN PRN For Nausea 





General


Time Seen by MD:  15:53





Chief Complaint


Other (Vomiting )


Hx Obtained From:  Patient


Arrived By:  Walk-in


Sudden in Onset?:  No


Recent Healthcare:  Recent doctor visit, Prior workup


Similar Sx Previous:  Yes





Risk Factors


)( AAA Risk Stratification


 HypertensionNo Prior AAA, No Smoking


Risk factors reviewed





Past Medical History


Past Medical History Notes:  


Patient seen in ED 12/31 for abd pain + diverticulitis on CT, also ED


visit diverticulitis


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


violaton per EMR 6/7/16





Surgeon: Lion Hathaway


Last abd CT--1/2016


Past Medical History





PAST MEDICAL HISTORY:


1.  Chronic neck and back pain secondary to degenerative disk disease.


2.  Chronic abdominal pain, with prior suggestion of possible sclerosing


    mesenteritis.


3.  Hypertension


4.  Asthma.


5.  Depression


6.  Diverticulosis with hx of diverticulitis (most recent 12/16, 1/17, and


here today 2/5/17 for sx)


7. Kidney stones


8. Gall bladder disease


9. GERD





Past Surgical History





PAST SURGICAL HISTORY:


1.  Bladder suspension.


2.  Right knee surgery.


3.  Right foot surgery.


4.  Cholecystectomy.


5.  Hysterectomy.


6.  Appendectomy.





Right Rotator Cuff Repair 8/6/2015-Dr. Hathaway


EGD and colonoscopy in May 2013 both negative


Bladder suspension


Right knee surgery


Fifth metatarsal surgery


Partial hysterectomy





Family History





Noncontributory





Smoking History


Never Smoker





Social History


Alcohol Use:  "Social"


Drug Use:  Denies drug use


Other Social History:  Local resident





Occupation





lives with partner of 17 years. On L and I  10/22/2016





Ambulatory Status


Independent





Review of Systems


Constitutional:  Reports: Fever, 


   Denies: Chills


Respiratory:  Reports: Non-productive cough


GI:  Reports: Abdominal pain, Diarrhea, Nausea, Vomiting, 


   Denies: Hematemesis, Hematochezia


Complete sys rev & neg:  except as marked.





Physical Exam


Vital Signs





 Vital Signs (First)








  Date Time  Temp Pulse Resp B/P Pulse Ox O2 Delivery O2 Flow Rate FiO2


 


3/14/17 15:10 36.9 87 18 127/89 97 Room Air  








Initial VS:  Reviewed, Vital signs normal


    Head / Eyes:  Atraumatic, Normocephalic


    Neurologic:  Alert, Oriented, Nonfocal


    Psychiatric:  Mood/affect normal, Behavior normal, Normal thought content


General/Constitutional:  Awake, Alert, Well developed


Rales / Rhonchi:  Positive: Rhonchi diffuse (Scattered )


Cardiovascular:  Peripheral circulation NL


Abdomen:  Soft


Tenderness/Guarding/Rebound:  Positive: Tender diffuse


Back:  Inspection NL


Skin:  Warm





Clammy





Interpretation & Diagnostics


Lab Results Interpretation


Result Diagram:  


3/14/17 1540                                                                   

             3/14/17 1540














Test


  3/14/17


15:40 3/14/17


15:45


 


White Blood Count


  4.7th/mm3


(3.8-10.1) 


 


 


Red Blood Count


  4.40mil/mm3


(3.90-5.20) 


 


 


Hemoglobin


  13.2g/dL


(12.0-15.6) 


 


 


Hematocrit


  40.3%


(35.0-46.0) 


 


 


Mean Corpuscular Volume


  91.6fL


() 


 


 


Mean Corpuscular Hemoglobin


  30.0pg


(27.0-35.0) 


 


 


Mean Corpuscular Hemoglobin


Concent 32.8%


(32.0-37.0) 


 


 


Red Cell Distribution Width


  13.6%


(12.3-15.4) 


 


 


Platelet Count


  186bil/L


(150-400) 


 


 


Neutrophils (%) (Auto) 48.3% (40-74)  


 


Lymphocytes (%) (Auto) 31.7% (14-46)  


 


Monocytes (%) (Auto) 12.1% (4-12)  


 


Eosinophils (%) (Auto) 5.1% (0-5)  


 


Basophils (%) (Auto) 2.6% (0-3)  


 


Sodium Level


  139mEq/L


(134-144) 


 


 


Potassium Level


  4.2mEq/L


(3.5-5.2) 


 


 


Chloride Level


  106mEq/L


() 


 


 


Carbon Dioxide Level


  21mmol/L


(18-29) 


 


 


Blood Urea Nitrogen 5mg/dL (6-24)  


 


Creatinine


  0.72mg/dL


(0.57-1.00) 


 


 


Estimat Glomerular Filtration


Rate 128mL/min


(>59) 


 


 


Glucose Level


  84mg/dL


(60-99) 


 


 


Calcium Level


  8.3mg/dL


(8.5-10.1) 


 


 


Magnesium Level


  1.9mg/dL


(1.6-2.6) 


 


 


Total Bilirubin


  0.2mg/dL


(0.0-1.2) 


 


 


Aspartate Amino Transf


(AST/SGOT) 32U/L (0-50) 


  


 


 


Alanine Aminotransferase


(ALT/SGPT) 39U/L (0-32) 


  


 


 


Alkaline Phosphatase


  130U/L


() 


 


 


Total Protein


  6.5g/dL


(6.4-8.4) 


 


 


Albumin


  3.6g/dL


(3.4-5.0) 


 


 


Lipase 35U/L (13-60)  


 


Hold Grey Top Tube


  


  Received


(Received)











Re-Eval/Medical Decision


Med Decision/Clinical Course


The patient was seen yesterday for the same symptoms and says they are 

continuing.  She denies any new or worsening symptoms was concerned about her 

ongoing symptoms.  I had a discussion with her about this likely being 

infectious, or abdominal cramping is not worrisome for diverticulitis.  The 

patient was visibly upset and I tried to give her more information and that an 

anterior line to work at making her feel better.  I also discussed CT scan of 

the abdomen which she declined and I agreed with do not think it was going to 

give us any new information.  I explained the plan however when the nurse went 

in to give her treatment she had absconded.


Re-Evaluation/Progress :  


   Time of Eval:  16:43


   Re-Evaluation/Progress Note:  


Patient absconded.





Discharge & Departure





 Primary Impression:  


 Abdominal pain


 Abdominal location:  generalized  Qualified Code:  R10.84 - Generalized 

abdominal pain


 Additional Impression:  


 Gastroenteritis


Disposition:  AGAINST MEDICAL ADVICE (absconded)


Referrals:  


MEDICAL CLINIC,[facility] (PCP)


Scribe Attestation


Portions of this note were transcribed by Julissa Cortez. I, Dr. Norris personally 

performed the history, physical exam and medical decision-making; I reviewed 

and confirmed the accuracy of the information in the transcribed note.





Signed by: Julissa Cortez 3/14/17, [id number]


copies to:   MEDICAL CLINIC,[facility]








Carol Norris MD Mar 14, 2017 15:55


JULISSA CORTEZ Mar 14, 2017 16:05

## 2017-04-25 ENCOUNTER — HOSPITAL ENCOUNTER (EMERGENCY)
Dept: HOSPITAL 21 - SED | Age: 42
Discharge: HOME | End: 2017-04-25
Payer: COMMERCIAL

## 2017-04-25 VITALS
SYSTOLIC BLOOD PRESSURE: 138 MMHG | HEART RATE: 80 BPM | OXYGEN SATURATION: 100 % | RESPIRATION RATE: 16 BRPM | DIASTOLIC BLOOD PRESSURE: 82 MMHG

## 2017-04-25 VITALS
SYSTOLIC BLOOD PRESSURE: 151 MMHG | RESPIRATION RATE: 19 BRPM | DIASTOLIC BLOOD PRESSURE: 103 MMHG | OXYGEN SATURATION: 100 % | HEART RATE: 85 BPM

## 2017-04-25 VITALS — HEIGHT: 66 IN | BODY MASS INDEX: 27.38 KG/M2 | WEIGHT: 170.35 LBS

## 2017-04-25 DIAGNOSIS — M54.9: ICD-10-CM

## 2017-04-25 DIAGNOSIS — Z88.6: ICD-10-CM

## 2017-04-25 DIAGNOSIS — Z87.42: ICD-10-CM

## 2017-04-25 DIAGNOSIS — Z88.2: ICD-10-CM

## 2017-04-25 DIAGNOSIS — R10.32: Primary | ICD-10-CM

## 2017-04-25 DIAGNOSIS — K21.9: ICD-10-CM

## 2017-04-25 DIAGNOSIS — J45.909: ICD-10-CM

## 2017-04-25 DIAGNOSIS — Z87.19: ICD-10-CM

## 2017-04-25 DIAGNOSIS — Z88.5: ICD-10-CM

## 2017-04-25 DIAGNOSIS — N64.4: ICD-10-CM

## 2017-04-25 DIAGNOSIS — Z90.49: ICD-10-CM

## 2017-04-25 DIAGNOSIS — Z88.0: ICD-10-CM

## 2017-04-25 DIAGNOSIS — I10: ICD-10-CM

## 2017-04-25 DIAGNOSIS — Z87.442: ICD-10-CM

## 2017-04-25 DIAGNOSIS — Z79.51: ICD-10-CM

## 2017-04-25 LAB
APPEARANCE UR: CLEAR
BASOPHILS % (AUTO): 0.3 % (ref 0–3)
COLOR,URINE: (no result)
EOSINOPHILS % (AUTO): 3.1 % (ref 0–5)
LIPASE SERPL-CCNC: 42 U/L (ref 13–60)
MCH RBC QN AUTO: 29.6 PG (ref 27–35)
MEAN CORPUSCULAR VOLUME: 91.2 FL (ref 81–100)
MONOCYTES % (AUTO): 5.7 % (ref 4–12)
NEUTROPHILS NFR BLD AUTO: 72.5 % (ref 40–74)
PH,URINE: 8 (ref 5–8)
PLATELET COUNT: 228 BIL/L (ref 150–400)
RBC UR QL: NEGATIVE
SPECIFIC GRAVITY,URINE: 1.01 (ref 1–1.03)
UROBILINOGEN UR-MCNC: NORMAL MG/DL
YEAST,URINE: (no result)

## 2017-04-25 PROCEDURE — 80053 COMPREHEN METABOLIC PANEL: CPT

## 2017-04-25 PROCEDURE — 99285 EMERGENCY DEPT VISIT HI MDM: CPT

## 2017-04-25 PROCEDURE — 76830 TRANSVAGINAL US NON-OB: CPT

## 2017-04-25 PROCEDURE — 85025 COMPLETE CBC W/AUTO DIFF WBC: CPT

## 2017-04-25 PROCEDURE — 96361 HYDRATE IV INFUSION ADD-ON: CPT

## 2017-04-25 PROCEDURE — 83690 ASSAY OF LIPASE: CPT

## 2017-04-25 PROCEDURE — 96375 TX/PRO/DX INJ NEW DRUG ADDON: CPT

## 2017-04-25 PROCEDURE — 36415 COLL VENOUS BLD VENIPUNCTURE: CPT

## 2017-04-25 PROCEDURE — 96374 THER/PROPH/DIAG INJ IV PUSH: CPT

## 2017-04-25 PROCEDURE — 81000 URINALYSIS NONAUTO W/SCOPE: CPT

## 2017-04-25 PROCEDURE — 76856 US EXAM PELVIC COMPLETE: CPT

## 2017-04-25 NOTE — ED.REPORT
HPI-Abd Pain F 40 and Over


Date of Service


Apr 25, 2017


 ED Provider: Ralf Kauffman MD


A 41 year old female with a history of diverticulitis, nephrolithiasis, ovarian 

cysts, and asthma presents to the ED complaining of diffuse abdominal pain 

onset 4 days ago. Pain is exacerbated by walking. She has experienced similar  

pain in the past and thought it was nephrolithiasis, but it turned out to be 

diverticulitis.  Associated symptoms include back pain and breast pain. She had 

diarrhea two days ago that was relieved with medication. She had minor relief 

the other day after taking Zofran and Aleve .  She denies any urinary or chest 

symptoms.  








.


Nursing Notes


Stated Complaint:  ABDOMINAL PAIN


Chief Complaint:  Female Abdominal Pain


Nursing Notes Reviewed:  Yes


Allergies:  


Coded Allergies:  


     Sulfa (Sulfonamide Antibiotics) (Verified  Allergy, Severe, Anaphylaxis, 4/ 25/17)


     meperidine (Verified  Allergy, Severe, ANAPHYLAXIS, 4/25/17)


     venom-honey bee (Verified  Allergy, Severe, Anaphylaxis, 4/25/17)


     Penicillins (Verified  Allergy, Unknown, 4/25/17)


     rofecoxib (Verified  Adverse Reaction, Severe, UPSET STOMACH, 3/14/17)


Scheduled PRN


Albuterol HFA (Proair HFA) 8.5 Gm Hfa.aer.ad 2 PUFFS INHALATION Q4H PRN PRN For 

Shortness of Breath 


Lorazepam (Lorazepam) 1 Mg Tablet 0.5-1 MG PO BID PRN PRN For Anxiety 


Naproxen Sodium (Aleve) 220 Mg Capsule 2 TAB PO BID PRN PRN For Pain 


Ondansetron ODT (Zofran ODT) 8 Mg Tablet 8 MG PO Q4H PRN PRN For Nausea 





General


Time Seen by MD:  13:08





Chief Complaint


Abdominal pain


Hx Obtained From:  Patient


Arrived By:  Walk-in


Sudden in Onset?:  No


Onset Occurred:  4 days ago


Symptom Duration:  Since onset


Location:  : Diffuse: LLQ


Severity: Current:  Severe


Severity: Maximum:  Severe


Recent Healthcare:  No recent doctor visit


Similar Sx Previous:  No





Past Medical History


Past Medical History Notes:  


Dr. Herbert at [facility] is PCP


Patient seen in ED 12/31 for abd pain + diverticulitis on CT, also ED


visit diverticulitis


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


violaton per EMR 6/7/16





Surgeon: Lion Hathaway


Last abd CT--1/2016





ED VISIT 3/14/17


Past Medical History





PAST MEDICAL HISTORY:


1.  Chronic neck and back pain secondary to degenerative disk disease.


2.  Chronic abdominal pain, with prior suggestion of possible sclerosing


    mesenteritis.


3.  Hypertension


4.  Asthma.


5.  Depression


6.  Diverticulosis with hx of diverticulitis (most recent 12/16, 1/17, and


here today 2/5/17 for sx)


7. Kidney stones


8. Gall bladder disease


9. GERD


Ovarian Cysts





Past Surgical History





PAST SURGICAL HISTORY:


1.  Bladder suspension.


2.  Right knee surgery.


3.  Right foot surgery.


4.  Cholecystectomy.


5.  Hysterectomy.


6.  Appendectomy.





Right Rotator Cuff Repair 8/6/2015-Dr. Hathaway


EGD and colonoscopy in May 2013 both negative


Bladder suspension


Right knee surgery


Fifth metatarsal surgery


Partial hysterectomy





Family History





Noncontributory





Smoking History


Never Smoker





Social History





Lives in [facility].


Alcohol Use:  "Social"


Drug Use:  Denies drug use


Other Social History:  Local resident





Occupation





lives with partner of 17 years. On L and I  10/22/2016





Ambulatory Status


Independent





Review of Systems





breast pain


Respiratory:  Denies: Non-productive cough


Cardiovascular:  Denies: Chest pain


GI:  Reports: Abdominal pain, Diarrhea


 Female:  Denies: Dysuria, Hematuria, Incontinence


Musculoskeletal:  Reports: Back pain


Complete sys rev & neg:  except as marked.





Physical Exam


Vital Signs





 Vital Signs (First)








  Date Time  Temp Pulse Resp B/P Pulse Ox O2 Delivery O2 Flow Rate FiO2


 


4/25/17 13:01 36.7 85 19 151/103 100 Room Air  








Initial VS:  Reviewed


General/Constitutional:  Awake, Alert


Distress / Hydration:  Positive: Distress moderate





Patient is in distress and is very uncomftorable.


Respiratory / Chest:  Atraumatic, Breath sounds NL, Breath sounds = bilat, No 

respiratory distress, No rales, No rhonchi, No wheezing


Cardiovascular:  Heart rate NL, Regular rhythm, Heart sounds NL, No gallop, No 

murmurs, No rubs


Abdomen:  No guarding


Tenderness/Guarding/Rebound:  Positive: Tender LLQ... (Moderate), Tender diffuse





Patient has signifigant diffuse abdominal tenderness, that is worse in


LLQ.


Back:  No CVA tenderness


Head / Eyes:  Atraumatic, Normocephalic, PERRL, EOMI


ENT:  Atraumatic, Mucous membranes moist


Skin:  Atraumatic, Color NL, Warm, Dry


Neurologic:  Oriented X3, Speech NL





Interpretation & Diagnostics


Lab Results Interpretation


Result Diagram:  


4/25/17 1320                                                                   

             4/25/17 1320














Test


  4/25/17


13:20 4/25/17


13:24 4/25/17


13:40


 


White Blood Count


  11.8th/mm3


(3.8-10.1) 


  


 


 


Red Blood Count


  4.56mil/mm3


(3.90-5.20) 


  


 


 


Hemoglobin


  13.5g/dL


(12.0-15.6) 


  


 


 


Hematocrit


  41.6%


(35.0-46.0) 


  


 


 


Mean Corpuscular Volume


  91.2fL


() 


  


 


 


Mean Corpuscular Hemoglobin


  29.6pg


(27.0-35.0) 


  


 


 


Mean Corpuscular Hemoglobin


Concent 32.5%


(32.0-37.0) 


  


 


 


Red Cell Distribution Width


  12.7%


(12.3-15.4) 


  


 


 


Platelet Count


  228bil/L


(150-400) 


  


 


 


Neutrophils (%) (Auto) 72.5% (40-74)   


 


Lymphocytes (%) (Auto) 18.1% (14-46)   


 


Monocytes (%) (Auto) 5.7% (4-12)   


 


Eosinophils (%) (Auto) 3.1% (0-5)   


 


Basophils (%) (Auto) 0.3% (0-3)   


 


Sodium Level


  138mEq/L


(134-144) 


  


 


 


Potassium Level


  3.7mEq/L


(3.5-5.2) 


  


 


 


Chloride Level


  102mEq/L


() 


  


 


 


Carbon Dioxide Level


  22mmol/L


(18-29) 


  


 


 


Blood Urea Nitrogen 10mg/dL (6-24)   


 


Creatinine


  0.69mg/dL


(0.57-1.00) 


  


 


 


Estimat Glomerular Filtration


Rate 134mL/min


(>59) 


  


 


 


Glucose Level


  88mg/dL


(60-99) 


  


 


 


Calcium Level


  9.3mg/dL


(8.5-10.1) 


  


 


 


Total Bilirubin


  0.2mg/dL


(0.0-1.2) 


  


 


 


Aspartate Amino Transf


(AST/SGOT) 17U/L (0-50) 


  


  


 


 


Alanine Aminotransferase


(ALT/SGPT) 25U/L (0-32) 


  


  


 


 


Alkaline Phosphatase 85U/L ()   


 


Total Protein


  7.2g/dL


(6.4-8.4) 


  


 


 


Albumin


  4.4g/dL


(3.4-5.0) 


  


 


 


Lipase 42U/L (13-60)   


 


Urine Color  Straw (YELLOW)  


 


Urine Appearance


  


  Clear


(CLEAR,HAZY) 


 


 


Urine pH  8.0 (5.0-8.0)  


 


Urine Specific Gravity


  


  1.015


(1.003-1.035) 


 


 


Urine Protein


  


  Negativemg/dL


(NEG,TRACE) 


 


 


Urine Glucose (UA)


  


  Negativemg/dL


(NEGATIVE) 


 


 


Urine Ketones


  


  Negativemg/dL


(NEGATIVE) 


 


 


Urine Occult Blood


  


  Negative


(NEGATIVE) 


 


 


Urine Nitrite


  


  Negative


(NEGATIVE) 


 


 


Urine Bilirubin


  


  Negative


(NEGATIVE) 


 


 


Urine Urobilinogen


  


  Normalmg/dL


(NORMAL) 


 


 


Urine Leukocyte Esterase


  


  Negative


(NEGATIVE) 


 


 


Urine RBC  0-2/hpf (0-2)  


 


Urine WBC  0-5/hpf (0-5)  


 


Urine Epithelial Cells


  


  Moderate/hpf


(NONE-MOD) 


 


 


Urine Crystals


  


  None seen


(NONE SEEN) 


 


 


Urine Bacteria


  


  Few/hpf


(NONE-FEW) 


 


 


Urine Hyaline Casts


  


  None/lpf


(NONE) 


 


 


Urine Granular Casts


  


  None seen


(NONE SEEN) 


 


 


Urine Waxy Casts


  


  None seen


(NONE SEEN) 


 


 


Urine Red Blood Cell Casts


  


  None seen


(NONE SEEN) 


 


 


Urine White Blood Cell Casts


  


  None seen


(NONE SEEN) 


 


 


Urine Mucus


  


  None seen


(None Seen) 


 


 


Urine Trichomonas


  


  None seen


(NONE SEEN) 


 


 


Urine Yeast


  


  None (NONE


SEEN) 


 


 


Urinalysis Comment  None  


 


Urine Culture Reflexed  Not indicated  


 


Hold Grey Top Tube


  


  


  Received


(Received)











US Focused OB





IMPRESSION: Right ovary has 2cm cyst. No free fluid.





 4/25/2017, 1422


Exam Performed by:  Radiologist


Exam Interpreted by:  Radiologist





Re-Eval/Medical Decision


Source of Hx:  Old records


Re-Evaluation/Progress #1:  


   Time of Eval:  13:18


   Re-Evaluation/Progress Note:  


Rechecked patient. Her last CT was in December. Seh was on chronic pain


medication for shoulder, had a few surgeries, then got off of the pain


meds. Last Prx narcotic was during her last visit. Reports that she is not


seeking narcotics.


Re-Evaluation/Progress #2:  


   Time of Eval:  14:32


   Patient Status:  Condition improved


   Re-Evaluation/Progress Note:  


Rechecked patient who still has abdominal pain, but feels tired now.


Explained normal test results. Explained plan for outpatient appointment


later this week. Explained plan for discharge. Patient understands and


agrees with the plan. All questions addressed.


Consultation :  


   Call Returned at:  14:22


   Note:  


Discusses patient US results with US who report that patient has right 2cm


ovarian cyst and no free fluid.


Counseled Regarding:  Diagnosis, Lab results, Need for follow-up





Discharge & Departure





 Primary Impression:  


 Abdominal pain


 Abdominal location:  left lower quadrant  Qualified Code:  R10.32 - Left lower 

quadrant pain


Disposition:  Home


Discharge Condition


All VS Reviewed:  Yes


Condition:  Stable


Patient Instructions:  Acute Abdominal Pain (ED)





Additional Instructions:


No dangerous cause for your pain was discovered today.  Use metoclopramide as 

needed for cramps or nausea.  Use hydrocodone/APAP sparingly.  Follow-up with 

Dr. Lindsay on Thursday 4/27 at 2:30 p.m.


Referrals:  


MEDICAL [facility] (PCP)


August Attestation


Portions of this note were transcribed by Oliver Whittington. I, Dr. Kauffman  

personally performed the history, physical exam and medical decision-making; I 

reviewed and confirmed the accuracy of the information in the transcribed note.





Signed by: August Freeman, 04/25/2017, 1506.


copies to:   MEDICAL [facility]; [facility]








Ralf Kauffman MD Apr 25, 2017 13:10


Oliver Whittington Apr 25, 2017 13:14

## 2017-04-25 NOTE — DRSVH
PROCEDURE:  US PELVIC SONOGRAM + TRANSVAGINAL SONOGRAM

 

INDICATIONS:  LLQ pain

 

TECHNIQUE:  

Real-time scanning was performed of the pelvic organs, with image documentation.  Additional endovagi
nal scanning was necessary due to incomplete visualization of the adnexal and endometrial structures 
by transabdominal scanning.  

 

COMPARISON:  None.

 

FINDINGS:  

(orthogonal measurements)

Right ovary size:  3.93 cm, 2.72 cm, 3.44 cm

Left ovary size:  2.58 cm, 1.72 cm, 2.57 cm

 

Transabdominal scanning:  Limited scanning through the kidneys shows no hydronephrosis.  No pathologi
c free abdominal or pelvic fluid.  

 

Endovaginal scanning:  

Uterus: Surgically absent.

 

Ovaries: Complex right ovarian cysts present measuring 2.2 x 2.0 cm.  Doppler assessment demonstrates
 no internal flow.  Normal left ovary.

 

IMPRESSION: Small hemorrhagic appearing right ovarian cyst.  Differential diagnosis would also includ
e endometrioma.  Recommend short-term followup pelvic ultrasound in 6-10 weeks.

 

 

 

Dictated by:  Saulo Blanchard [facility] Interpreted: Venice Riojas MD on 4/25/2017 at 16:41   

Transcribed by:  HEATHER on 4/25/2017 at 16:42    

Approved by:  Venice Riojas MD, PhD on 4/25/2017 at 16:43

## 2017-07-12 ENCOUNTER — HOSPITAL ENCOUNTER (EMERGENCY)
Dept: HOSPITAL 21 - SED | Age: 42
Discharge: HOME | End: 2017-07-12
Payer: COMMERCIAL

## 2017-07-12 VITALS
HEART RATE: 62 BPM | OXYGEN SATURATION: 100 % | SYSTOLIC BLOOD PRESSURE: 127 MMHG | DIASTOLIC BLOOD PRESSURE: 89 MMHG | RESPIRATION RATE: 16 BRPM

## 2017-07-12 VITALS
DIASTOLIC BLOOD PRESSURE: 90 MMHG | RESPIRATION RATE: 16 BRPM | OXYGEN SATURATION: 100 % | HEART RATE: 90 BPM | SYSTOLIC BLOOD PRESSURE: 126 MMHG

## 2017-07-12 VITALS — BODY MASS INDEX: 28.7 KG/M2 | HEIGHT: 66 IN | WEIGHT: 178.57 LBS

## 2017-07-12 VITALS
OXYGEN SATURATION: 100 % | HEART RATE: 62 BPM | DIASTOLIC BLOOD PRESSURE: 89 MMHG | RESPIRATION RATE: 16 BRPM | SYSTOLIC BLOOD PRESSURE: 127 MMHG

## 2017-07-12 DIAGNOSIS — I10: ICD-10-CM

## 2017-07-12 DIAGNOSIS — F32.9: ICD-10-CM

## 2017-07-12 DIAGNOSIS — K57.92: ICD-10-CM

## 2017-07-12 DIAGNOSIS — Z88.2: ICD-10-CM

## 2017-07-12 DIAGNOSIS — J45.909: ICD-10-CM

## 2017-07-12 DIAGNOSIS — Z87.442: ICD-10-CM

## 2017-07-12 DIAGNOSIS — K21.9: ICD-10-CM

## 2017-07-12 DIAGNOSIS — Z88.0: ICD-10-CM

## 2017-07-12 DIAGNOSIS — K52.9: Primary | ICD-10-CM

## 2017-07-12 DIAGNOSIS — Z88.8: ICD-10-CM

## 2017-07-12 LAB
APPEARANCE UR: (no result)
BASOPHILS % (AUTO): 0.3 % (ref 0–3)
COLOR,URINE: (no result)
EOSINOPHILS % (AUTO): 1.1 % (ref 0–5)
LIPASE SERPL-CCNC: 58 U/L (ref 13–60)
MAGNESIUM: 1.7 MG/DL (ref 1.6–2.6)
MCH RBC QN AUTO: 30.2 PG (ref 27–35)
MEAN CORPUSCULAR VOLUME: 89.7 FL (ref 81–100)
MONOCYTES % (AUTO): 4.8 % (ref 4–12)
NEUTROPHILS NFR BLD AUTO: 76.8 % (ref 40–74)
PH,URINE: 6.5 (ref 5–8)
PLATELET COUNT: 228 BIL/L (ref 150–400)
RBC UR QL: NEGATIVE
SPECIFIC GRAVITY,URINE: 1.02 (ref 1–1.03)
UROBILINOGEN UR-MCNC: NORMAL MG/DL
YEAST,URINE: (no result)

## 2017-07-12 PROCEDURE — 74177 CT ABD & PELVIS W/CONTRAST: CPT

## 2017-07-12 PROCEDURE — 83690 ASSAY OF LIPASE: CPT

## 2017-07-12 PROCEDURE — 96365 THER/PROPH/DIAG IV INF INIT: CPT

## 2017-07-12 PROCEDURE — 36415 COLL VENOUS BLD VENIPUNCTURE: CPT

## 2017-07-12 PROCEDURE — 96376 TX/PRO/DX INJ SAME DRUG ADON: CPT

## 2017-07-12 PROCEDURE — 83735 ASSAY OF MAGNESIUM: CPT

## 2017-07-12 PROCEDURE — 99285 EMERGENCY DEPT VISIT HI MDM: CPT

## 2017-07-12 PROCEDURE — 81000 URINALYSIS NONAUTO W/SCOPE: CPT

## 2017-07-12 PROCEDURE — 80053 COMPREHEN METABOLIC PANEL: CPT

## 2017-07-12 PROCEDURE — 85025 COMPLETE CBC W/AUTO DIFF WBC: CPT

## 2017-07-12 PROCEDURE — 96375 TX/PRO/DX INJ NEW DRUG ADDON: CPT

## 2017-07-12 PROCEDURE — 96368 THER/DIAG CONCURRENT INF: CPT

## 2017-07-12 NOTE — DRSVH
PROCEDURE:  CT ABDOMEN AND PELVIS WITH CONTRAST ([id number])

 

INDICATIONS: Abdominal pain

 

TECHNIQUE:  

After the administration of oral and intravenous contrast, 5 mm thick sections acquired from the diap
hragms to the symphysis.  5 mm thick coronal and sagittal reformats were performed.  For radiation do
se reduction, the following was used:  automated exposure control, adjustment of mA and/or kV accordi
ng to patient size.  

 

COMPARISON:  [facility], CT, CT ABD PELVIS W CON, 12/31/2016, 4:28.

 

FINDINGS:  

Image quality:  Excellent.  

 

ABDOMEN:  

Lung bases:  Lung bases are clear.  There is mild concentric wall thickening in the distal esophagus.
  Heart size is normal.  

 

Solid organs:  Liver and spleen are normal in size and enhancement.  Gallbladder is surgically absent
.  Biliary system is slightly dilated likely related to prior cholecystectomy.  Pancreas enhances nor
kwan.  No adrenal nodules.  Kidneys are normal in size and enhancement, without hydronephrosis.  

 

Peritoneum and bowel:  Stomach and small bowel loops are normal in caliber and wall thickness.  There
 is diverticulosis throughout the transverse, descending, and rectosigmoid colon.  Slight wall thicke
zhou is noted within the sigmoid colon suggesting a mild colitis.  No free fluid or air.  

 

Nodes and vessels:  No retroperitoneal or mesenteric adenopathy.  Aorta and inferior vena cava are no
rmal in caliber.  

 

Miscellaneous:  No ventral hernias.  

 

 

PELVIS:  

Genitourinary:  Bladder wall thickness is normal.  

 

Miscellaneous:  No inguinal hernias or adenopathy.  

 

Bones:  No suspicious bony lesions.  No vertebral body compression fractures.  

 

IMPRESSION:

 

1.  Mild wall thickening in the sigmoid colon suggesting a mild colitis, likely infectious or inflamm
atory in etiology.

 

2.  Diverticulosis of the transverse, descending, and rectosigmoid colon.  No definite associated inf
lammatory fat stranding, fluid, or free air.  

 

 

Dictated by: Sander Gruber M.D. on 7/12/2017 at 15:47     

Approved by: Sander Gruber M.D. on 7/12/2017 at 15:57

## 2017-07-12 NOTE — ED.REPORT
HPI-Abd Pain F 40 and Over


Date of Service


Jul 12, 2017


 ED Provider: 


History of Present Illness:  


stomach pain started 2 to 3 days ago. decreased eating. pain is located on


left lower side. urinating without difficulty. primary care is geovany at


[place]. now [facility]


Nursing Notes


Stated Complaint:  NAUSEA, VOMITING


Chief Complaint:  Female Abdominal Pain


Nursing Notes Reviewed:  Yes


Allergies:  


Coded Allergies:  


     Sulfa (Sulfonamide Antibiotics) (Verified  Allergy, Severe, Anaphylaxis, 4/ 25/17)


     meperidine (Verified  Allergy, Severe, ANAPHYLAXIS, 4/25/17)


     venom-honey bee (Verified  Allergy, Severe, Anaphylaxis, 4/25/17)


     Penicillins (Verified  Allergy, Unknown, 4/25/17)


     rofecoxib (Verified  Adverse Reaction, Severe, UPSET STOMACH, 3/14/17)


Scheduled PRN


Albuterol HFA (Proair HFA) 8.5 Gm Hfa.aer.ad 2 PUFFS INHALATION Q4H PRN PRN For 

Shortness of Breath 





General


Time Seen by MD:  14:26





Chief Complaint


Abdominal pain


Hx Obtained From:  Patient


Sudden in Onset?:  No


Onset Occurred:  3 days ago


Symptom Duration:  Since onset


Location:  : [facility]





Past Medical History


Past Medical History Notes:  


Dr. Herbert at [facility] is PCP


Patient seen in ED 12/31 for abd pain + diverticulitis on CT, also ED


visit diverticulitis


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


violaton per EMR 6/7/16





Surgeon: Lion Hathaway


Last abd CT--1/2016





ED VISIT 3/14/17


Past Medical History





PAST MEDICAL HISTORY:


1.  Chronic neck and back pain secondary to degenerative disk disease.


2.  Chronic abdominal pain, with prior suggestion of possible sclerosing


    mesenteritis.


3.  Hypertension


4.  Asthma.


5.  Depression


6.  Diverticulosis with hx of diverticulitis (most recent 12/16, 1/17, and


here today 2/5/17 for sx)


7. Kidney stones


8. Gall bladder disease


9. GERD


Ovarian Cysts





Past Surgical History





PAST SURGICAL HISTORY:


1.  Bladder suspension.


2.  Right knee surgery.


3.  Right foot surgery.


4.  Cholecystectomy.


5.  Hysterectomy.


6.  Appendectomy.





Right Rotator Cuff Repair 8/6/2015-Dr. Hathaway


EGD and colonoscopy in May 2013 both negative


Bladder suspension


Right knee surgery


Fifth metatarsal surgery


Partial hysterectomy





Family History





Noncontributory





Smoking History


Never Smoker





Social History





Lives in [facility].


Alcohol Use:  "Social"


Drug Use:  Denies drug use


Other Social History:  Local resident





Occupation





lives with partner of 17 years. On L and I  10/22/2016





Ambulatory Status


Independent





Review of Systems


Basic Review of Systems


Eyes:  Vision NL, No discharge


Skin:  No bruising, No rash, No itch


Psychiatric:  Normal thought content





Physical Exam


Vital Signs





 Vital Signs (First)








  Date Time  Temp Pulse Resp B/P Pulse Ox O2 Delivery O2 Flow Rate FiO2


 


7/12/17 14:13 36.8 90 16 126/90 100   


 


7/12/17 17:02      Room Air  








Initial VS:  Reviewed, Vital signs normal


    Head / Eyes:  Atraumatic, Normocephalic, PERRL


    ENT:  Mucous membranes moist, Conjunctiva normal, No scleral icterus


    Neck:  Supple, Non-tender, Full range of motion


    Lymphatic:  No lymphadenopathy


    Extremities:  Vascular intact, Neuro intact, No swelling, No tenderness


    Skin:  Warm, Dry, No cyanosis


    Neurologic:  Alert, Oriented, Nonfocal


    Psychiatric:  Mood/affect normal, Behavior normal, Normal thought content


General/Constitutional:  Awake, Alert


Distress / Hydration:  Positive: Distress moderate


Respiratory / Chest:  Atraumatic, Breath sounds NL, Breath sounds = bilat, No 

respiratory distress


Cardiovascular:  Heart rate NL, Regular rhythm, Heart sounds NL


Tenderness/Guarding/Rebound:  Positive: Tender LLQ... (Moderate)


Bowel Sounds / Distention:  Positive: Bowel sounds hypoactive


Back:  Atraumatic, Inspection NL, Full range of motion


Head / Eyes:  Atraumatic, Normocephalic, PERRL





Interpretation & Diagnostics


Lab Results Interpretation


Result Diagram:  


7/12/17 1425                                                                   

             7/12/17 1425














Test


  7/12/17


14:25 7/12/17


14:39


 


White Blood Count


  10.9th/mm3


(3.8-10.1) 


 


 


Red Blood Count


  4.64mil/mm3


(3.90-5.20) 


 


 


Hemoglobin


  14.0g/dL


(12.0-15.6) 


 


 


Hematocrit


  41.6%


(35.0-46.0) 


 


 


Mean Corpuscular Volume


  89.7fL


() 


 


 


Mean Corpuscular Hemoglobin


  30.2pg


(27.0-35.0) 


 


 


Mean Corpuscular Hemoglobin


Concent 33.7%


(32.0-37.0) 


 


 


Red Cell Distribution Width


  14.0%


(12.3-15.4) 


 


 


Platelet Count


  228bil/L


(150-400) 


 


 


Neutrophils (%) (Auto) 76.8% (40-74)  


 


Lymphocytes (%) (Auto) 16.8% (14-46)  


 


Monocytes (%) (Auto) 4.8% (4-12)  


 


Eosinophils (%) (Auto) 1.1% (0-5)  


 


Basophils (%) (Auto) 0.3% (0-3)  


 


Sodium Level


  139mEq/L


(134-144) 


 


 


Potassium Level


  3.7mEq/L


(3.5-5.2) 


 


 


Chloride Level


  105mEq/L


() 


 


 


Carbon Dioxide Level


  18mmol/L


(18-29) 


 


 


Blood Urea Nitrogen 13mg/dL (6-24)  


 


Creatinine


  0.91mg/dL


(0.57-1.00) 


 


 


Estimat Glomerular Filtration


Rate 97mL/min (>59) 


  


 


 


Glucose Level


  94mg/dL


(60-99) 


 


 


Calcium Level


  9.4mg/dL


(8.5-10.1) 


 


 


Magnesium Level


  1.7mg/dL


(1.6-2.6) 


 


 


Total Bilirubin


  0.2mg/dL


(0.0-1.2) 


 


 


Aspartate Amino Transf


(AST/SGOT) 19U/L (0-50) 


  


 


 


Alanine Aminotransferase


(ALT/SGPT) 19U/L (0-32) 


  


 


 


Alkaline Phosphatase 77U/L ()  


 


Total Protein


  7.7g/dL


(6.4-8.4) 


 


 


Albumin


  4.4g/dL


(3.4-5.0) 


 


 


Lipase 58U/L (13-60)  


 


Urine Color  Straw (YELLOW) 


 


Urine Appearance


  


  Hazy


(CLEAR,HAZY)


 


Urine pH  6.5 (5.0-8.0) 


 


Urine Specific Gravity


  


  1.020


(1.003-1.035)


 


Urine Protein


  


  Negativemg/dL


(NEG,TRACE)


 


Urine Glucose (UA)


  


  Negativemg/dL


(NEGATIVE)


 


Urine Ketones


  


  Negativemg/dL


(NEGATIVE)


 


Urine Occult Blood


  


  Negative


(NEGATIVE)


 


Urine Nitrite


  


  Negative


(NEGATIVE)


 


Urine Bilirubin


  


  Negative


(NEGATIVE)


 


Urine Urobilinogen


  


  Normalmg/dL


(NORMAL)


 


Urine Leukocyte Esterase


  


  Negative


(NEGATIVE)


 


Urine RBC  0-2/hpf (0-2) 


 


Urine WBC  0-5/hpf (0-5) 


 


Urine Epithelial Cells


  


  Occasional/hpf


(NONE-MOD)


 


Urine Crystals


  


  None seen


(NONE SEEN)


 


Urine Bacteria


  


  Few/hpf


(NONE-FEW)


 


Urine Hyaline Casts


  


  None/lpf


(NONE)


 


Urine Granular Casts


  


  None seen


(NONE SEEN)


 


Urine Waxy Casts


  


  None seen


(NONE SEEN)


 


Urine Red Blood Cell Casts


  


  None seen


(NONE SEEN)


 


Urine White Blood Cell Casts


  


  None seen


(NONE SEEN)


 


Urine Mucus


  


  Present (None


Seen)


 


Urine Trichomonas


  


  None seen


(NONE SEEN)


 


Urine Yeast


  


  None (NONE


SEEN)


 


Urinalysis Comment  None 


 


Urine Culture Reflexed  Not indicated 











CT Abd / Pelvis Interpretation





PROCEDURE:  CT ABDOMEN AND PELVIS WITH CONTRAST ([id number])


 


INDICATIONS: Abdominal pain


 


TECHNIQUE:  


After the administration of oral and intravenous contrast, 5 mm thick


sections acquired from the diaphragms to the symphysis.  5 mm thick


coronal and sagittal reformats were performed.  For radiation dose


reduction, the following was used:  automated exposure control, adjustment


of mA and/or kV according to patient size.


 


COMPARISON:  [facility], CT, CT ABD PELVIS W CON, 12/31/2016,


4:28.


 


FINDINGS:  


Image quality:  Excellent.  


 


ABDOMEN:  


Lung bases:  Lung bases are clear.  There is mild concentric wall


thickening in the distal esophagus.  Heart size is normal.


 


Solid organs:  Liver and spleen are normal in size and enhancement.


Gallbladder is surgically absent.  Biliary system is slightly dilated


likely related to prior cholecystectomy.  Pancreas enhances normally.  No


adrenal nodules.  Kidneys are normal in size and enhancement, without


hydronephrosis.


 


Peritoneum and bowel:  Stomach and small bowel loops are normal in caliber


and wall thickness.  There is diverticulosis throughout the transverse,


descending, and rectosigmoid colon.  Slight wall thickening is noted


within the sigmoid colon suggesting a mild colitis.  No free fluid or air.





 


Nodes and vessels:  No retroperitoneal or mesenteric adenopathy.  Aorta


and inferior vena cava are normal in caliber.


 


Miscellaneous:  No ventral hernias.  


 


 


PELVIS:  


Genitourinary:  Bladder wall thickness is normal.  


 


Miscellaneous:  No inguinal hernias or adenopathy.  


 


Bones:  No suspicious bony lesions.  No vertebral body compression


fractures.


 


IMPRESSION:


 


1.  Mild wall thickening in the sigmoid colon suggesting a mild colitis,


likely infectious or inflammatory in etiology.


 


2.  Diverticulosis of the transverse, descending, and rectosigmoid colon.


No definite associated inflammatory fat stranding, fluid, or free air.


 


 


Dictated by: Sander Gruber M.D. on 7/12/2017 at 15:47     


Approved by: Sander Gruber M.D. on 7/12/2017 at 15:57





Re-Eval/Medical Decision


Med Decision/Clinical Course





42 year old female presents to the ER with left sided abd pain of 3 days


duration. Patient reports this is similiar to her divertisulitits attack


earlier this year. Patient in Moderate distress. CT shows colitis of the


sigmoind colon and her ongoing divertulitis. Patient nausea resolved with


8 mg of zofran, pain is under control with dilaulid. No sign of


appendicitis or perforation or abscess formation.





Discharge & Departure





 Primary Impression:  


 Colitis


 Additional Impression:  


 Diverticulitis


 Diverticulitis complication:  without perforation or abscess


Disposition:  Home


Patient Instructions:  Colonoscopy (GEN), Diverticulosis (ED), Diverticulosis 

Diet (ED)





Additional Instructions:


Your labs show a mold elevation in your white count. Your urine looks good, no 

sign of infection. The CT shows mild wall thickening in the sigmoid colon 

suggesting a colitis. It does show that you have diverticulosis of the tranverse

, descending and rectosigmoid colon. Continue with the oral antibiotics, cipro 

and flagyl . You can use percocet 1 up to 2 times a day as needed for severe 

pain. Please make an appointment with GI. You will need a colonscopy. Please 

discuss with the GI provider if melasamine might be helpful. Use zofran 8 mg 

for nausea. REturn with any concerns.


Referrals:  


MEDICAL CLINIC,[facility] (PCP)











[facility]


EDSupervising Provider for APC:  Hawk Kearns MD


copies to:   [facility]








Chayo Rao Jul 12, 2017 14:27

## 2017-07-15 ENCOUNTER — HOSPITAL ENCOUNTER (EMERGENCY)
Dept: HOSPITAL 21 - SED | Age: 42
End: 2017-07-15
Payer: COMMERCIAL

## 2017-07-15 DIAGNOSIS — Z53.21: Primary | ICD-10-CM

## 2017-08-28 ENCOUNTER — HOSPITAL ENCOUNTER (EMERGENCY)
Dept: HOSPITAL 21 - SED | Age: 42
Discharge: HOME | End: 2017-08-28
Payer: COMMERCIAL

## 2017-08-28 VITALS
OXYGEN SATURATION: 100 % | RESPIRATION RATE: 24 BRPM | DIASTOLIC BLOOD PRESSURE: 92 MMHG | SYSTOLIC BLOOD PRESSURE: 163 MMHG | HEART RATE: 79 BPM

## 2017-08-28 VITALS
HEART RATE: 64 BPM | RESPIRATION RATE: 16 BRPM | OXYGEN SATURATION: 100 % | DIASTOLIC BLOOD PRESSURE: 77 MMHG | SYSTOLIC BLOOD PRESSURE: 133 MMHG

## 2017-08-28 VITALS — BODY MASS INDEX: 28.99 KG/M2 | WEIGHT: 180.38 LBS | HEIGHT: 66 IN

## 2017-08-28 VITALS
HEART RATE: 64 BPM | RESPIRATION RATE: 16 BRPM | DIASTOLIC BLOOD PRESSURE: 77 MMHG | OXYGEN SATURATION: 100 % | SYSTOLIC BLOOD PRESSURE: 133 MMHG

## 2017-08-28 DIAGNOSIS — K21.9: ICD-10-CM

## 2017-08-28 DIAGNOSIS — I10: ICD-10-CM

## 2017-08-28 DIAGNOSIS — Z87.442: ICD-10-CM

## 2017-08-28 DIAGNOSIS — Z88.2: ICD-10-CM

## 2017-08-28 DIAGNOSIS — Z90.89: ICD-10-CM

## 2017-08-28 DIAGNOSIS — Z88.0: ICD-10-CM

## 2017-08-28 DIAGNOSIS — J45.909: ICD-10-CM

## 2017-08-28 DIAGNOSIS — K57.92: Primary | ICD-10-CM

## 2017-08-28 DIAGNOSIS — Z90.49: ICD-10-CM

## 2017-08-28 DIAGNOSIS — Z79.51: ICD-10-CM

## 2017-08-28 LAB
BASOPHILS % (AUTO): 0.3 % (ref 0–3)
EOSINOPHILS % (AUTO): 1.9 % (ref 0–5)
LIPASE SERPL-CCNC: 36 U/L (ref 13–60)
MAGNESIUM: 1.4 MG/DL (ref 1.6–2.6)
MCH RBC QN AUTO: 29.8 PG (ref 27–35)
MEAN CORPUSCULAR VOLUME: 90.7 FL (ref 81–100)
MONOCYTES % (AUTO): 8 % (ref 4–12)
NEUTROPHILS NFR BLD AUTO: 64.1 % (ref 40–74)
PLATELET COUNT: 228 BIL/L (ref 150–400)

## 2017-08-28 PROCEDURE — 96376 TX/PRO/DX INJ SAME DRUG ADON: CPT

## 2017-08-28 PROCEDURE — 83735 ASSAY OF MAGNESIUM: CPT

## 2017-08-28 PROCEDURE — 80053 COMPREHEN METABOLIC PANEL: CPT

## 2017-08-28 PROCEDURE — 36415 COLL VENOUS BLD VENIPUNCTURE: CPT

## 2017-08-28 PROCEDURE — 99285 EMERGENCY DEPT VISIT HI MDM: CPT

## 2017-08-28 PROCEDURE — 96375 TX/PRO/DX INJ NEW DRUG ADDON: CPT

## 2017-08-28 PROCEDURE — 85025 COMPLETE CBC W/AUTO DIFF WBC: CPT

## 2017-08-28 PROCEDURE — 96365 THER/PROPH/DIAG IV INF INIT: CPT

## 2017-08-28 PROCEDURE — 96361 HYDRATE IV INFUSION ADD-ON: CPT

## 2017-08-28 PROCEDURE — 83690 ASSAY OF LIPASE: CPT

## 2017-08-28 NOTE — ED.REPORT
HPI-Abd Pain F 40 and Over


Date of Service


Aug 28, 2017


 ED Provider: Betsy Palacios MD


Patient is a 42 year old female with a hx of diverticulitis who presents to the 

ED complaining of L sided abdominal pain onset 3 days ago. Associated symptoms 

include nausea, vomiting, and diarrhea onset 2 days ago. She reports that his 

feels similar to her previous diverticulitis flare ups, the most recent being a 

month and a half ago. A CT scan was done at this time and she was treated with 

Flagyl and Cipro. She denies fever, chills, hematochezia, or any other 

symptoms. 


She took anti-nausea medication and Ibuprofen at 0645 this morning.  she has 

not been on antibiotics recently. 


Her last colonoscopy was a few years ago. 





Patient sees Dr. Spaulding.


Nursing Notes


Stated Complaint:  ABDOMINAL PAIN


Chief Complaint:  Female Abdominal Pain


Nursing Notes Reviewed:  Yes


Allergies:  


Coded Allergies:  


     Sulfa (Sulfonamide Antibiotics) (Verified  Allergy, Severe, Anaphylaxis, 4/ 25/17)


     meperidine (Verified  Allergy, Severe, ANAPHYLAXIS, 4/25/17)


     venom-honey bee (Verified  Allergy, Severe, Anaphylaxis, 4/25/17)


     Penicillins (Verified  Allergy, Unknown, 4/25/17)


     rofecoxib (Verified  Adverse Reaction, Severe, UPSET STOMACH, 3/14/17)


Scheduled


Ciprofloxacin (Ciprofloxacin) 750 Mg Tablet 750 MG PO BID 


Metronidazole (Metronidazole) 500 Mg Tablet 500 MG PO TID 


Ondansetron ODT (Ondansetron ODT) 4 Mg Tab.rapdis 4 MG PO TID 





Scheduled PRN


Albuterol HFA (Proair HFA) 8.5 Gm Hfa.aer.ad 2 PUFFS INHALATION Q4H PRN PRN For 

Shortness of Breath 


oxyCODONE-Acetaminophen 5-325 mg (oxyCODONE-Acetaminophen 5-325 mg) 1 Each 

Tablet 1-2 TAB PO Q6H PRN PRN For Pain 





General


Time Seen by MD:  09:18





Chief Complaint


Abdominal pain


Hx Obtained From:  Patient


Arrived By:  Walk-in


Sudden in Onset?:  Yes


Onset Occurred:  3 days ago


Symptom Duration:  Since onset


Progression since Onset:  Gradually worsening


Quality:  Painful


Severity: Current:  Severe


Severity: Maximum:  Severe


Recent Healthcare:  Recent doctor visit


Similar Sx Previous:  Yes





Risk Factors


)( AAA Risk Stratification


 Hypertension


Risk factors reviewed





Past Medical History


Past Medical History Notes:  


Dr. Herbert at [facility] is PCP


Patient seen in ED 12/31 for abd pain + diverticulitis on CT, also ED


visit diverticulitis


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


violaton per EMR 6/7/16





Surgeon: Lion Hathaway


Last abd CT--1/2016


Past Medical History





PAST MEDICAL HISTORY:


1.  Chronic neck and back pain secondary to degenerative disk disease.


2.  Chronic abdominal pain, with prior suggestion of possible sclerosing


    mesenteritis.


3.  Hypertension


4.  Asthma.


5.  Depression


6.  Diverticulosis with hx of diverticulitis (most recent 12/16, 1/17, and


here today 2/5/17 for sx)


7. Kidney stones


8. Gall bladder disease


9. GERD


Ovarian Cysts


Syncope





Past Surgical History





PAST SURGICAL HISTORY:


1.  Bladder suspension.


2.  Right knee surgery.


3.  Right foot surgery.


4.  Cholecystectomy.


5.  Hysterectomy.


6.  Appendectomy.





Right Rotator Cuff Repair 8/6/2015-Dr. Hathaway


EGD and colonoscopy in May 2013 both negative


Bladder suspension


Right knee surgery


Fifth metatarsal surgery


Partial hysterectomy





Family History





Noncontributory





Smoking History


Never Smoker





Social History





Lives in [facility].


Alcohol Use:  "Social"


Drug Use:  Denies drug use


Other Social History:  Local resident





Occupation





lives with partner of 17 years. On L and I  10/22/2016





Ambulatory Status


Independent





Review of Systems


Constitutional:  Denies: Chills, Fever


Respiratory:  Denies: Shortness of breath


Cardiovascular:  Denies: Chest pain


GI:  Reports: Abdominal pain, Diarrhea, Nausea, Vomiting, 


   Denies: Hematochezia


Complete sys rev & neg:  except as marked.





Physical Exam


Vital Signs





 Vital Signs (First)








  Date Time  Temp Pulse Resp B/P Pulse Ox O2 Delivery O2 Flow Rate FiO2


 


8/28/17 09:06 36.9 79 24 163/92 100 Room Air  








Initial VS:  Reviewed, Vital signs normal


    Head / Eyes:  Atraumatic, Normocephalic


    Neck:  Supple, Full range of motion


    Skin:  Warm, Dry


    Neurologic:  Alert, Oriented, Nonfocal


    Psychiatric:  Mood/affect normal, Behavior normal, Normal thought content


General/Constitutional:  Awake, Alert


Distress / Hydration:  Positive: Distress moderate


Respiratory / Chest:  Atraumatic, Breath sounds NL, Breath sounds = bilat, No 

respiratory distress, No wheezing


Cardiovascular:  Heart rate NL, Regular rhythm


Heart Sounds / Murmur:  Positive: Systolic murmur present.. (II/VI)





Well perfused


Abdomen:  Atraumatic, Soft, BS normoactive





LLQ tenderness


Back:  Atraumatic


Lower Extremity / Pelvis / MS:  No edema





Interpretation & Diagnostics


Lab Results Interpretation


Result Diagram:  


8/28/17 0920                                                                   

             8/28/17 0920














Test


  8/28/17


09:18 8/28/17


09:20


 


Hold Urine


  Received


(Received) 


 


 


White Blood Count


  


  7.0th/mm3


(3.8-10.1)


 


Red Blood Count


  


  4.43mil/mm3


(3.90-5.20)


 


Hemoglobin


  


  13.2g/dL


(12.0-15.6)


 


Hematocrit


  


  40.2%


(35.0-46.0)


 


Mean Corpuscular Volume


  


  90.7fL


()


 


Mean Corpuscular Hemoglobin


  


  29.8pg


(27.0-35.0)


 


Mean Corpuscular Hemoglobin


Concent 


  32.8%


(32.0-37.0)


 


Red Cell Distribution Width


  


  12.2%


(12.3-15.4)


 


Platelet Count


  


  228bil/L


(150-400)


 


Neutrophils (%) (Auto)  64.1% (40-74) 


 


Lymphocytes (%) (Auto)  25.6% (14-46) 


 


Monocytes (%) (Auto)  8.0% (4-12) 


 


Eosinophils (%) (Auto)  1.9% (0-5) 


 


Basophils (%) (Auto)  0.3% (0-3) 


 


Sodium Level


  


  138mEq/L


(134-144)


 


Potassium Level


  


  3.8mEq/L


(3.5-5.2)


 


Chloride Level


  


  102mEq/L


()


 


Carbon Dioxide Level


  


  21mmol/L


(18-29)


 


Blood Urea Nitrogen  6mg/dL (6-24) 


 


Creatinine


  


  0.59mg/dL


(0.57-1.00)


 


Estimat Glomerular Filtration


Rate 


  160mL/min


(>59)


 


Glucose Level


  


  96mg/dL


(60-99)


 


Calcium Level


  


  8.7mg/dL


(8.5-10.1)


 


Magnesium Level


  


  1.4mg/dL


(1.6-2.6)


 


Total Bilirubin


  


  0.2mg/dL


(0.0-1.2)


 


Aspartate Amino Transf


(AST/SGOT) 


  13U/L (0-50) 


 


 


Alanine Aminotransferase


(ALT/SGPT) 


  14U/L (0-32) 


 


 


Alkaline Phosphatase  73U/L () 


 


Total Protein


  


  6.6g/dL


(6.4-8.4)


 


Albumin


  


  3.9g/dL


(3.4-5.0)


 


Lipase  36U/L (13-60) 


 


Hold Grey Top Tube


  


  Received


(Received)











Re-Eval/Medical Decision


Re-Evaluation/Progress :  


   Time of Eval:  11:24


   Re-Evaluation/Progress Note:  


Discussed plan for discharge. Patient understands and agrees with plan.


All questions addressed at this time.


Counseled Regarding:  Diagnosis, Lab results, Need for follow-up, When/why to 

return to ED





Discharge & Departure





 Primary Impression:  


 Diverticulitis


 Diverticulitis site:  unspecified part of intestinal tract  Diverticulitis 

bleeding:  without bleeding  Diverticulitis complication:  without perforation 

or abscess  Qualified Code:  K57.92 - Diverticulitis of intestine, part 

unspecified, without perforation or abscess without bleeding


Disposition:  Home


Discharge Condition


All VS Reviewed:  Yes


Condition:  Stable








You got IV levaquin to get antibiotics started in the ER.  You will need


to complete a course of oral flagyl and cipro again.





If the diarrhea gets worse, you will need to have that re-evaluated.





Please follow up with Dr Lee regarding all of your GI issues as


well as this recurrent diverticulitis.





I hope you feel better!


Referrals:  


MEDICAL CLINIC,[facility] (PCP)


August Attestation


Portions of this note were transcribed by Julissa Cortez. I, Dr. Palacios 

personally performed the history, physical exam and medical decision-making; I 

reviewed and confirmed the accuracy of the information in the transcribed note.


Signed by: August Moore, 8/28/17


copies to:   David Cates MD, Shawna L MD Aug 28, 2017 09:20


JULISSA CORTEZ Aug 28, 2017 09:30

## 2021-03-19 ENCOUNTER — HOSPITAL ENCOUNTER (EMERGENCY)
Dept: HOSPITAL 76 - ED | Age: 46
Discharge: HOME | End: 2021-03-19
Payer: MEDICAID

## 2021-03-19 VITALS — SYSTOLIC BLOOD PRESSURE: 148 MMHG | DIASTOLIC BLOOD PRESSURE: 91 MMHG
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
## 2021-03-19 DIAGNOSIS — S81.842A: Primary | ICD-10-CM
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
## 2021-03-19 PROCEDURE — 99281 EMR DPT VST MAYX REQ PHY/QHP: CPT

## 2021-03-19 PROCEDURE — 99282 EMERGENCY DEPT VISIT SF MDM: CPT

## 2021-03-19 NOTE — ED PHYSICIAN DOCUMENTATION
PD HPI LOWER EXT INJURY





- Stated complaint


Stated Complaint: LT LEG INJ





- Chief complaint


Chief Complaint: Ext Problem





- History obtained from


History obtained from: Patient





- History of Present Illness


PD HPI LOW EXT INJURY LOCATION: Left, Lower leg


Type of injury: Foreign body


Where injury occurred: Home


Timing - onset: Today


Timing - duration: Hours


Timing - details: Abrupt onset, Still present


Improved by: Rest, Immobilization


Worsened by: Moving, Palpating


Associated symptoms: No: Weakness, Numbness, Tingling


Contributing factors: No: Anticoagulated


Similar symptoms before: Diagnosis (sliver)


Recently seen: Not recently seen





- Additional information


Additional information: 


45-year-old female walking in her home bumped her leg against some soup lap 

inside and had a large sliver to the distal left lateral calf.  The sliver broke

off at the surface and the patient has retained foreign body.  This looks like a

fairly good sized sliver and the patient has pain radiating up the side of her 

calf.








Review of Systems


Constitutional: denies: Fever


Nose: denies: Congestion


Throat: denies: Sore throat


Cardiac: denies: Chest pain / pressure


Respiratory: denies: Dyspnea, Cough


GI: denies: Vomiting


Skin: reports: Other (fb to calf)


Musculoskeletal: denies: Neck pain, Extremity swelling


Neurologic: denies: Generalized weakness, Focal weakness, Numbness





PD PAST MEDICAL HISTORY





- Present Medications


Home Medications: 


                                Ambulatory Orders











 Medication  Instructions  Recorded  Confirmed


 


Albuterol Sulfate [Proair Hfa  03/19/21 





Inhaler]   














- Allergies


Allergies/Adverse Reactions: 


                                    Allergies











Allergy/AdvReac Type Severity Reaction Status Date / Time


 


Penicillins Allergy  Unknown Verified 03/19/21 11:36


 


Sulfa (Sulfonamide Allergy  Unknown Verified 03/19/21 11:36





Antibiotics)     














PD ED PE NORMAL





- Vitals


Vital signs reviewed: Yes (hypertensive )





- General


General: Alert and oriented X 3, Well developed/nourished, Other (appears 

anxious)





- HEENT


HEENT: Atraumatic, PERRL, EOMI





- Respiratory


Respiratory: No respiratory distress





- Derm


Derm: Normal color, Warm and dry, No rash





- Extremities


Extremities: No deformity, Other (There is a wooden foreign body protruding from

the skin of the lower calf on the left side laterally )





- Neuro


Neuro: Alert and oriented X 3, CNs 2-12 intact, No motor deficit, No sensory 

deficit, Normal speech


Eye Opening: Spontaneous


Motor: Obeys Commands


Verbal: Oriented


GCS Score: 15





- Psych


Psych: Normal mood, Normal affect





Results





- Vitals


Vitals: 


                               Vital Signs - 24 hr











  03/19/21 03/19/21





  11:31 12:48


 


Temperature 37 C 36.6 C


 


Heart Rate 90 79


 


Respiratory 20 20





Rate  


 


Blood Pressure 152/96 H 148/91 H


 


O2 Saturation 97 100








                                     Oxygen











O2 Source                      Room air

















Procedures





- FB removal


FB location: Subcutaneous


FB removal preparation: Local anesthesia-specify (bupivicaine)


Removal method: Foreceps


FB removal aftercare: No complications, Patient tolerated well, Removed 

successfully





PD MEDICAL DECISION MAKING





- ED course


Complexity details: reviewed old records, re-evaluated patient, considered 

differential, d/w patient


ED course: 





45-year-old female with a large splinter in the lateral aspect of her left 

distal calf has foreign body sensation and pain associated with this foreign 

body and when it is removed she has relief.  She has some brief bleeding 

associated with this.  There was a 3 cm wooden splinter removed intact.





Departure





- Departure


Disposition: 01 Home, Self Care


Clinical Impression: 


 Superficial foreign body (sliver)





Condition: Stable


Instructions:  ED Foreign Body Soft Tissue Removed


Follow-Up: 


Your, doctor [Other]


Discharge Date/Time: 03/19/21 12:46